# Patient Record
Sex: FEMALE | Race: WHITE | URBAN - METROPOLITAN AREA
[De-identification: names, ages, dates, MRNs, and addresses within clinical notes are randomized per-mention and may not be internally consistent; named-entity substitution may affect disease eponyms.]

---

## 2023-04-05 ENCOUNTER — NURSING HOME VISIT (OUTPATIENT)
Dept: POST ACUTE CARE | Facility: EXTERNAL LOCATION | Age: 88
End: 2023-04-05
Payer: MEDICARE

## 2023-04-05 DIAGNOSIS — J44.9 CHRONIC OBSTRUCTIVE PULMONARY DISEASE, UNSPECIFIED COPD TYPE (MULTI): ICD-10-CM

## 2023-04-05 DIAGNOSIS — I10 HYPERTENSION, UNSPECIFIED TYPE: ICD-10-CM

## 2023-04-05 DIAGNOSIS — K21.9 GASTROESOPHAGEAL REFLUX DISEASE WITHOUT ESOPHAGITIS: ICD-10-CM

## 2023-04-05 PROCEDURE — 99309 SBSQ NF CARE MODERATE MDM 30: CPT | Performed by: INTERNAL MEDICINE

## 2023-04-05 NOTE — LETTER
Patient: Mildred Nolen  : 1930    Encounter Date: 2023    PROGRESS NOTE    Subjective  Chief complaint: Mildred Nolen is a 89 y.o. female who is a long term care patient being seen and evaluated for monthly general medical care and follow-up.    HPI:   patient presents for general medical care and f/u.  Patient seen and examined at bedside.  No issues per nursing.  Patient has no acute complaints.    COPD stable, denies sob, wheezing, cough.  GERD controlled.  Denies heartburn, regurgitation, epigastric discomfort, sour taste, and cough.  HTN BP at goal.  Denies chest pain and headache.  No acute distress.       Objective  Vital signs: 124/74, 18, 98    Physical Exam  Constitutional:       General: She is not in acute distress.  Eyes:      Extraocular Movements: Extraocular movements intact.   Cardiovascular:      Rate and Rhythm: Regular rhythm.   Pulmonary:      Effort: Pulmonary effort is normal.      Breath sounds: Normal breath sounds.   Abdominal:      General: Bowel sounds are normal.      Palpations: Abdomen is soft.   Musculoskeletal:      Cervical back: Neck supple.      Right lower leg: Edema present.      Left lower leg: Edema present.   Neurological:      Mental Status: She is alert.   Psychiatric:         Mood and Affect: Mood normal.         Behavior: Behavior is cooperative.         Assessment/Plan  Problem List Items Addressed This Visit          Respiratory    COPD (chronic obstructive pulmonary disease) (CMS/[facility])     Fluticasone            Circulatory    HTN (hypertension)     Monitor BP  antihypertensives              Digestive    Gastroesophageal reflux disease without esophagitis     Controlled  PPI          Medications, treatments, and labs reviewed  Continue medications and treatments as listed in [facility]    Scribe Attestation  By signing my name below, ITahmina, Scribe   attest that this documentation has been prepared under the direction and in the presence of Nadir ROJAS  MD Wayne.    Provider Attestation - Scribe documentation  All medical record entries made by the Scribe were at my direction and personally dictated by me. I have reviewed the chart and agree that the record accurately reflects my personal performance of the history, physical exam, discussion and plan.      Electronically Signed By: Nadir Black MD   4/7/23 12:56 PM

## 2023-04-06 PROBLEM — K21.9 GASTROESOPHAGEAL REFLUX DISEASE WITHOUT ESOPHAGITIS: Status: ACTIVE | Noted: 2023-04-06

## 2023-04-06 PROBLEM — I10 HTN (HYPERTENSION): Status: ACTIVE | Noted: 2023-04-06

## 2023-04-06 PROBLEM — J44.9 COPD (CHRONIC OBSTRUCTIVE PULMONARY DISEASE) (MULTI): Status: ACTIVE | Noted: 2023-04-06

## 2023-04-07 NOTE — PROGRESS NOTES
PROGRESS NOTE    Subjective   Chief complaint: Mildred Nolen is a 89 y.o. female who is a long term care patient being seen and evaluated for monthly general medical care and follow-up.    HPI:   patient presents for general medical care and f/u.  Patient seen and examined at bedside.  No issues per nursing.  Patient has no acute complaints.    COPD stable, denies sob, wheezing, cough.  GERD controlled.  Denies heartburn, regurgitation, epigastric discomfort, sour taste, and cough.  HTN BP at goal.  Denies chest pain and headache.  No acute distress.       Objective   Vital signs: 124/74, 18, 98    Physical Exam  Constitutional:       General: She is not in acute distress.  Eyes:      Extraocular Movements: Extraocular movements intact.   Cardiovascular:      Rate and Rhythm: Regular rhythm.   Pulmonary:      Effort: Pulmonary effort is normal.      Breath sounds: Normal breath sounds.   Abdominal:      General: Bowel sounds are normal.      Palpations: Abdomen is soft.   Musculoskeletal:      Cervical back: Neck supple.      Right lower leg: Edema present.      Left lower leg: Edema present.   Neurological:      Mental Status: She is alert.   Psychiatric:         Mood and Affect: Mood normal.         Behavior: Behavior is cooperative.         Assessment/Plan   Problem List Items Addressed This Visit          Respiratory    COPD (chronic obstructive pulmonary disease) (CMS/[facility])     Fluticasone            Circulatory    HTN (hypertension)     Monitor BP  antihypertensives              Digestive    Gastroesophageal reflux disease without esophagitis     Controlled  PPI          Medications, treatments, and labs reviewed  Continue medications and treatments as listed in [facility]    Scribe Attestation  By signing my name below, Tahmina CHRISTIANSON, Scribe   attest that this documentation has been prepared under the direction and in the presence of Nadir Black MD.    Provider Attestation - Scribe documentation  All medical  record entries made by the Scribe were at my direction and personally dictated by me. I have reviewed the chart and agree that the record accurately reflects my personal performance of the history, physical exam, discussion and plan.

## 2023-04-26 ENCOUNTER — NURSING HOME VISIT (OUTPATIENT)
Dept: POST ACUTE CARE | Facility: EXTERNAL LOCATION | Age: 88
End: 2023-04-26
Payer: MEDICARE

## 2023-04-26 DIAGNOSIS — R05.1 ACUTE COUGH: ICD-10-CM

## 2023-04-26 DIAGNOSIS — J44.9 CHRONIC OBSTRUCTIVE PULMONARY DISEASE, UNSPECIFIED COPD TYPE (MULTI): ICD-10-CM

## 2023-04-26 PROCEDURE — 99308 SBSQ NF CARE LOW MDM 20: CPT | Performed by: INTERNAL MEDICINE

## 2023-04-26 NOTE — LETTER
Patient: Mildred Nolen  : 1930    Encounter Date: 2023    PROGRESS NOTE    Subjective  Chief complaint: Mildred Nolen is a 89 y.o. female who is a long term care patient being seen and evaluated for cough    HPI:  Patient presents for complaints of cough. Denies SOB, wheezing, congestion, fever or chills. No other concerns at this time.       Objective  Vital signs: 126/76, 98%    Physical Exam  Constitutional:       General: She is not in acute distress.  Eyes:      Extraocular Movements: Extraocular movements intact.   Cardiovascular:      Rate and Rhythm: Normal rate and regular rhythm.   Pulmonary:      Effort: Pulmonary effort is normal.      Breath sounds: Normal breath sounds.   Abdominal:      General: Bowel sounds are normal.      Palpations: Abdomen is soft.   Musculoskeletal:      Cervical back: Neck supple.      Right lower leg: No edema.      Left lower leg: No edema.   Neurological:      Mental Status: She is alert.   Psychiatric:         Mood and Affect: Mood normal.         Behavior: Behavior is cooperative.         Assessment/Plan  Problem List Items Addressed This Visit          Respiratory    COPD (chronic obstructive pulmonary disease) (CMS/HCC)     Fluticasone            Other    Acute cough     Mucinex  Monitor          Medications, treatments, and labs reviewed  Continue medications and treatments as listed in [facility]    Scribe Attestation  By signing my name below, ITahmina Scribe   attest that this documentation has been prepared under the direction and in the presence of Nadir Black MD.    Provider Attestation - Scribe documentation  All medical record entries made by the Scribe were at my direction and personally dictated by me. I have reviewed the chart and agree that the record accurately reflects my personal performance of the history, physical exam, discussion and plan.    1. Chronic obstructive pulmonary disease, unspecified COPD type (CMS/HCC)        2. Acute  cough               Electronically Signed By: Nadir Black MD   4/27/23  5:01 PM

## 2023-04-27 PROBLEM — R05.1 ACUTE COUGH: Status: ACTIVE | Noted: 2023-04-27

## 2023-04-27 NOTE — PROGRESS NOTES
PROGRESS NOTE    Subjective   Chief complaint: Mildred Nolen is a 89 y.o. female who is a long term care patient being seen and evaluated for cough    HPI:  Patient presents for complaints of cough. Denies SOB, wheezing, congestion, fever or chills. No other concerns at this time.       Objective   Vital signs: 126/76, 98%    Physical Exam  Constitutional:       General: She is not in acute distress.  Eyes:      Extraocular Movements: Extraocular movements intact.   Cardiovascular:      Rate and Rhythm: Normal rate and regular rhythm.   Pulmonary:      Effort: Pulmonary effort is normal.      Breath sounds: Normal breath sounds.   Abdominal:      General: Bowel sounds are normal.      Palpations: Abdomen is soft.   Musculoskeletal:      Cervical back: Neck supple.      Right lower leg: No edema.      Left lower leg: No edema.   Neurological:      Mental Status: She is alert.   Psychiatric:         Mood and Affect: Mood normal.         Behavior: Behavior is cooperative.         Assessment/Plan   Problem List Items Addressed This Visit          Respiratory    COPD (chronic obstructive pulmonary disease) (CMS/HCC)     Fluticasone            Other    Acute cough     Mucinex  Monitor          Medications, treatments, and labs reviewed  Continue medications and treatments as listed in [facility]    Scribe Attestation  By signing my name below, ITahmina Scribe   attest that this documentation has been prepared under the direction and in the presence of Nadir Black MD.    Provider Attestation - Scribe documentation  All medical record entries made by the Scribe were at my direction and personally dictated by me. I have reviewed the chart and agree that the record accurately reflects my personal performance of the history, physical exam, discussion and plan.    1. Chronic obstructive pulmonary disease, unspecified COPD type (CMS/HCC)        2. Acute cough

## 2023-05-10 ENCOUNTER — NURSING HOME VISIT (OUTPATIENT)
Dept: POST ACUTE CARE | Facility: EXTERNAL LOCATION | Age: 88
End: 2023-05-10
Payer: MEDICARE

## 2023-05-10 DIAGNOSIS — J44.9 CHRONIC OBSTRUCTIVE PULMONARY DISEASE, UNSPECIFIED COPD TYPE (MULTI): ICD-10-CM

## 2023-05-10 DIAGNOSIS — K21.9 GASTROESOPHAGEAL REFLUX DISEASE WITHOUT ESOPHAGITIS: ICD-10-CM

## 2023-05-10 DIAGNOSIS — I10 HYPERTENSION, UNSPECIFIED TYPE: ICD-10-CM

## 2023-05-10 PROCEDURE — 99309 SBSQ NF CARE MODERATE MDM 30: CPT | Performed by: INTERNAL MEDICINE

## 2023-05-10 NOTE — LETTER
Patient: Mildred Nolen  : 1930    Encounter Date: 05/10/2023    PROGRESS NOTE    Subjective  Chief complaint: Mildred Nolen is a 89 y.o. female who is a long term care patient being seen and evaluated for monthly general medical care and follow-up.    HPI:   patient presents for general medical care and f/u.  Patient seen and examined at bedside.  No issues per nursing.  Patient has no acute complaints.    COPD, denies sob, wheezing, cough.  GERD. Denies heartburn, regurgitation, epigastric discomfort, sour taste, and cough.  HTN,  Denies chest pain and headache.  No acute distress.       Objective  Vital signs: 126/73, 98%    Physical Exam  Constitutional:       General: She is not in acute distress.     Appearance: She is obese.   Eyes:      Extraocular Movements: Extraocular movements intact.   Cardiovascular:      Rate and Rhythm: Regular rhythm.   Pulmonary:      Effort: Pulmonary effort is normal.      Breath sounds: Normal breath sounds.   Abdominal:      General: Bowel sounds are normal.      Palpations: Abdomen is soft.   Musculoskeletal:      Cervical back: Neck supple.      Right lower leg: Edema present.      Left lower leg: Edema present.   Neurological:      Mental Status: She is alert.   Psychiatric:         Mood and Affect: Mood normal.         Behavior: Behavior is cooperative.         Assessment/Plan  Problem List Items Addressed This Visit          Respiratory    COPD (chronic obstructive pulmonary disease) (CMS/[facility])       Continue Fluticasone   monitor for shortness of breath            Circulatory    HTN (hypertension)     Monitor BP   Continue antihypertensives              Digestive    Gastroesophageal reflux disease without esophagitis     Controlled   Continue PPI        Medications, treatments, and labs reviewed  Continue medications and treatments as listed in [facility]    Scribe Attestation  By signing my name below, ITahmina, Scribe   attest that this documentation has been  prepared under the direction and in the presence of Nadir Black MD.    Provider Attestation - Scribe documentation  All medical record entries made by the Scribe were at my direction and personally dictated by me. I have reviewed the chart and agree that the record accurately reflects my personal performance of the history, physical exam, discussion and plan.      Electronically Signed By: Nadir Black MD   5/15/23 11:15 AM

## 2023-05-15 NOTE — PROGRESS NOTES
PROGRESS NOTE    Subjective   Chief complaint: Mildred Nolen is a 89 y.o. female who is a long term care patient being seen and evaluated for monthly general medical care and follow-up.    HPI:   patient presents for general medical care and f/u.  Patient seen and examined at bedside.  No issues per nursing.  Patient has no acute complaints.    COPD, denies sob, wheezing, cough.  GERD. Denies heartburn, regurgitation, epigastric discomfort, sour taste, and cough.  HTN,  Denies chest pain and headache.  No acute distress.       Objective   Vital signs: 126/73, 98%    Physical Exam  Constitutional:       General: She is not in acute distress.     Appearance: She is obese.   Eyes:      Extraocular Movements: Extraocular movements intact.   Cardiovascular:      Rate and Rhythm: Regular rhythm.   Pulmonary:      Effort: Pulmonary effort is normal.      Breath sounds: Normal breath sounds.   Abdominal:      General: Bowel sounds are normal.      Palpations: Abdomen is soft.   Musculoskeletal:      Cervical back: Neck supple.      Right lower leg: Edema present.      Left lower leg: Edema present.   Neurological:      Mental Status: She is alert.   Psychiatric:         Mood and Affect: Mood normal.         Behavior: Behavior is cooperative.         Assessment/Plan   Problem List Items Addressed This Visit          Respiratory    COPD (chronic obstructive pulmonary disease) (CMS/[facility])       Continue Fluticasone   monitor for shortness of breath            Circulatory    HTN (hypertension)     Monitor BP   Continue antihypertensives              Digestive    Gastroesophageal reflux disease without esophagitis     Controlled   Continue PPI        Medications, treatments, and labs reviewed  Continue medications and treatments as listed in [facility]    Scribe Attestation  By signing my name below, Tahmina CHRISTIANSON, Scribe   attest that this documentation has been prepared under the direction and in the presence of Nadir Black  MD.    Provider Attestation - Scribe documentation  All medical record entries made by the Scribe were at my direction and personally dictated by me. I have reviewed the chart and agree that the record accurately reflects my personal performance of the history, physical exam, discussion and plan.

## 2023-05-31 ENCOUNTER — NURSING HOME VISIT (OUTPATIENT)
Dept: POST ACUTE CARE | Facility: EXTERNAL LOCATION | Age: 88
End: 2023-05-31
Payer: MEDICARE

## 2023-05-31 DIAGNOSIS — I10 HYPERTENSION, UNSPECIFIED TYPE: ICD-10-CM

## 2023-05-31 DIAGNOSIS — J44.9 CHRONIC OBSTRUCTIVE PULMONARY DISEASE, UNSPECIFIED COPD TYPE (MULTI): ICD-10-CM

## 2023-05-31 PROCEDURE — 99308 SBSQ NF CARE LOW MDM 20: CPT | Performed by: INTERNAL MEDICINE

## 2023-05-31 NOTE — LETTER
Patient: Mildred Nolen  : 1930    Encounter Date: 2023    PROGRESS NOTE    Subjective  Chief complaint: Mildred Nolen is a 89 y.o. female who is a long term care patient being seen and evaluated for pain and cough    HPI:  I was asked to see patient and evaluate continued use of Mucinex. Patient has an order for PRN mucinex and cough has resolved. Denies SOB or Orthopnea. No acute distress. In addition patient with chronic pain I was asked to evaluate continued of Seroquel and percocet. Patient pain well controlled with pain meds ordered at this time. Denies SOB.        Objective  Vital signs: 125/74, 98%    Physical Exam  Constitutional:       General: She is not in acute distress.     Appearance: She is obese.   Eyes:      Extraocular Movements: Extraocular movements intact.   Cardiovascular:      Rate and Rhythm: Normal rate and regular rhythm.   Pulmonary:      Effort: Pulmonary effort is normal.      Breath sounds: Normal breath sounds.   Abdominal:      General: Bowel sounds are normal.      Palpations: Abdomen is soft.   Musculoskeletal:      Cervical back: Neck supple.      Right lower leg: Edema present.      Left lower leg: Edema present.   Neurological:      Mental Status: She is alert.   Psychiatric:         Mood and Affect: Mood normal.         Behavior: Behavior is cooperative.         Assessment/Plan  Problem List Items Addressed This Visit          Respiratory    COPD (chronic obstructive pulmonary disease) (CMS/[facility])       Continue Fluticasone   monitor for shortness of breath            Circulatory    HTN (hypertension)     Monitor BP   Continue antihypertensives            Medications, treatments, and labs reviewed  Continue medications and treatments as listed in [facility]    Scribe Attestation  By signing my name below, ITahmina Scribe   attest that this documentation has been prepared under the direction and in the presence of Nadir Black MD.    Provider Attestation - Scribe  documentation  All medical record entries made by the Scribe were at my direction and personally dictated by me. I have reviewed the chart and agree that the record accurately reflects my personal performance of the history, physical exam, discussion and plan.    1. Hypertension, unspecified type        2. Chronic obstructive pulmonary disease, unspecified COPD type (CMS/[facility])               Electronically Signed By: Nadir Black MD   6/4/23  2:01 PM

## 2023-06-03 NOTE — PROGRESS NOTES
PROGRESS NOTE    Subjective   Chief complaint: Mildred Nolen is a 89 y.o. female who is a long term care patient being seen and evaluated for pain and cough    HPI:  I was asked to see patient and evaluate continued use of Mucinex. Patient has an order for PRN mucinex and cough has resolved. Denies SOB or Orthopnea. No acute distress. In addition patient with chronic pain I was asked to evaluate continued of Seroquel and percocet. Patient pain well controlled with pain meds ordered at this time. Denies SOB.        Objective   Vital signs: 125/74, 98%    Physical Exam  Constitutional:       General: She is not in acute distress.     Appearance: She is obese.   Eyes:      Extraocular Movements: Extraocular movements intact.   Cardiovascular:      Rate and Rhythm: Normal rate and regular rhythm.   Pulmonary:      Effort: Pulmonary effort is normal.      Breath sounds: Normal breath sounds.   Abdominal:      General: Bowel sounds are normal.      Palpations: Abdomen is soft.   Musculoskeletal:      Cervical back: Neck supple.      Right lower leg: Edema present.      Left lower leg: Edema present.   Neurological:      Mental Status: She is alert.   Psychiatric:         Mood and Affect: Mood normal.         Behavior: Behavior is cooperative.         Assessment/Plan   Problem List Items Addressed This Visit          Respiratory    COPD (chronic obstructive pulmonary disease) (CMS/[facility])       Continue Fluticasone   monitor for shortness of breath            Circulatory    HTN (hypertension)     Monitor BP   Continue antihypertensives            Medications, treatments, and labs reviewed  Continue medications and treatments as listed in [facility]    Scribe Attestation  By signing my name below, Tahmina CHRISTIANSON Scribe   attest that this documentation has been prepared under the direction and in the presence of Nadir Black MD.    Provider Attestation - Scribe documentation  All medical record entries made by the Scribe were at  my direction and personally dictated by me. I have reviewed the chart and agree that the record accurately reflects my personal performance of the history, physical exam, discussion and plan.    1. Hypertension, unspecified type        2. Chronic obstructive pulmonary disease, unspecified COPD type (CMS/[facility])

## 2023-06-07 ENCOUNTER — NURSING HOME VISIT (OUTPATIENT)
Dept: POST ACUTE CARE | Facility: EXTERNAL LOCATION | Age: 88
End: 2023-06-07
Payer: MEDICARE

## 2023-06-07 DIAGNOSIS — J44.9 CHRONIC OBSTRUCTIVE PULMONARY DISEASE, UNSPECIFIED COPD TYPE (MULTI): ICD-10-CM

## 2023-06-07 DIAGNOSIS — L03.119 CELLULITIS OF LOWER EXTREMITY, UNSPECIFIED LATERALITY: ICD-10-CM

## 2023-06-07 DIAGNOSIS — K21.9 GASTROESOPHAGEAL REFLUX DISEASE WITHOUT ESOPHAGITIS: ICD-10-CM

## 2023-06-07 DIAGNOSIS — I10 HYPERTENSION, UNSPECIFIED TYPE: ICD-10-CM

## 2023-06-07 PROCEDURE — 99309 SBSQ NF CARE MODERATE MDM 30: CPT | Performed by: INTERNAL MEDICINE

## 2023-06-07 NOTE — LETTER
Patient: Mildred Nolen  : 1930    Encounter Date: 2023    PROGRESS NOTE    Subjective  Chief complaint: Mildred Nolen is a 89 y.o. female who is a long term care patient being seen and evaluated for monthly general medical care and follow-up.    HPI:   patient presents for general medical care and f/u.  Patient seen and examined at bedside.  No issues per nursing.  Patient has no acute complaints.    COPD stable, denies sob, wheezing, cough.  GERD controlled.  Denies heartburn, regurgitation, epigastric discomfort, sour taste, and cough.  HTN BP at goal.  Denies chest pain and headache. Patient continues on ATB for Cellulitis BLE, denies fever,chills, diarrhea or increased redness or pain.  No acute distress.       Objective  Vital signs: 124/74, 18, 98    Physical Exam  Constitutional:       General: She is not in acute distress.  Eyes:      Extraocular Movements: Extraocular movements intact.   Cardiovascular:      Rate and Rhythm: Regular rhythm.   Pulmonary:      Effort: Pulmonary effort is normal.      Breath sounds: Normal breath sounds.   Abdominal:      General: Bowel sounds are normal.      Palpations: Abdomen is soft.   Musculoskeletal:      Cervical back: Neck supple.      Right lower leg: Edema present.      Left lower leg: Edema present.   Neurological:      Mental Status: She is alert.   Psychiatric:         Mood and Affect: Mood normal.         Behavior: Behavior is cooperative.         Assessment/Plan  Problem List Items Addressed This Visit          Respiratory    COPD (chronic obstructive pulmonary disease) (CMS/[facility])       Continue Fluticasone   monitor for shortness of breath            Circulatory    HTN (hypertension)     Monitor BP   Continue antihypertensives              Digestive    Gastroesophageal reflux disease without esophagitis     Controlled   Continue PPI            Musculoskeletal    Cellulitis of lower extremity     Continue ATB until complete        Medications,  treatments, and labs reviewed  Continue medications and treatments as listed in [facility]    Scribe Attestation  By signing my name below, I, Darlyn Steeleibyomaira   attest that this documentation has been prepared under the direction and in the presence of Nadir Black MD.    Provider Attestation - Scribe documentation  All medical record entries made by the Scribe were at my direction and personally dictated by me. I have reviewed the chart and agree that the record accurately reflects my personal performance of the history, physical exam, discussion and plan.      Electronically Signed By: Nadir Black MD   6/10/23  2:28 PM

## 2023-06-09 PROBLEM — L03.119 CELLULITIS OF LOWER EXTREMITY: Status: ACTIVE | Noted: 2023-06-09

## 2023-06-09 NOTE — PROGRESS NOTES
PROGRESS NOTE    Subjective   Chief complaint: Mildred Nolen is a 89 y.o. female who is a long term care patient being seen and evaluated for monthly general medical care and follow-up.    HPI:   patient presents for general medical care and f/u.  Patient seen and examined at bedside.  No issues per nursing.  Patient has no acute complaints.    COPD stable, denies sob, wheezing, cough.  GERD controlled.  Denies heartburn, regurgitation, epigastric discomfort, sour taste, and cough.  HTN BP at goal.  Denies chest pain and headache. Patient continues on ATB for Cellulitis BLE, denies fever,chills, diarrhea or increased redness or pain.  No acute distress.       Objective   Vital signs: 124/74, 18, 98    Physical Exam  Constitutional:       General: She is not in acute distress.  Eyes:      Extraocular Movements: Extraocular movements intact.   Cardiovascular:      Rate and Rhythm: Regular rhythm.   Pulmonary:      Effort: Pulmonary effort is normal.      Breath sounds: Normal breath sounds.   Abdominal:      General: Bowel sounds are normal.      Palpations: Abdomen is soft.   Musculoskeletal:      Cervical back: Neck supple.      Right lower leg: Edema present.      Left lower leg: Edema present.   Neurological:      Mental Status: She is alert.   Psychiatric:         Mood and Affect: Mood normal.         Behavior: Behavior is cooperative.         Assessment/Plan   Problem List Items Addressed This Visit          Respiratory    COPD (chronic obstructive pulmonary disease) (CMS/[facility])       Continue Fluticasone   monitor for shortness of breath            Circulatory    HTN (hypertension)     Monitor BP   Continue antihypertensives              Digestive    Gastroesophageal reflux disease without esophagitis     Controlled   Continue PPI            Musculoskeletal    Cellulitis of lower extremity     Continue ATB until complete        Medications, treatments, and labs reviewed  Continue medications and treatments as  listed in [facility]    Scribe Attestation  By signing my name below, I, Joseph Steele   attest that this documentation has been prepared under the direction and in the presence of Nadir Black MD.    Provider Attestation - Scribe documentation  All medical record entries made by the Scribe were at my direction and personally dictated by me. I have reviewed the chart and agree that the record accurately reflects my personal performance of the history, physical exam, discussion and plan.

## 2023-06-14 ENCOUNTER — NURSING HOME VISIT (OUTPATIENT)
Dept: POST ACUTE CARE | Facility: EXTERNAL LOCATION | Age: 88
End: 2023-06-14
Payer: MEDICARE

## 2023-06-14 DIAGNOSIS — L03.116 CELLULITIS OF LEFT LOWER EXTREMITY: ICD-10-CM

## 2023-06-14 DIAGNOSIS — J44.9 CHRONIC OBSTRUCTIVE PULMONARY DISEASE, UNSPECIFIED COPD TYPE (MULTI): ICD-10-CM

## 2023-06-14 PROCEDURE — 99308 SBSQ NF CARE LOW MDM 20: CPT | Performed by: INTERNAL MEDICINE

## 2023-06-14 NOTE — LETTER
Patient: Mildred Nolen  : 1930    Encounter Date: 2023    PROGRESS NOTE    Subjective  Chief complaint: Mildred Nolen is a 89 y.o. female who is a long term care patient being seen and evaluated for cellulitis    HPI:  Patient has wound to her left heel which was recently debrided. She denies increased pain or discomfort to the area, however there is some redness and warmth to surrounding skin. No acute distress. Denies SOB.       Objective  Vital signs: 134/73, 97%    Physical Exam  Constitutional:       General: She is not in acute distress.  Eyes:      Extraocular Movements: Extraocular movements intact.   Cardiovascular:      Rate and Rhythm: Normal rate and regular rhythm.   Pulmonary:      Effort: Pulmonary effort is normal.      Breath sounds: Normal breath sounds.   Abdominal:      General: Bowel sounds are normal.      Palpations: Abdomen is soft.   Musculoskeletal:      Cervical back: Neck supple.      Right lower leg: Edema present.      Left lower leg: Edema present.   Skin:     Comments: Left heel wound - dressing intact  +redness to surrounding skin   Neurological:      Mental Status: She is alert.   Psychiatric:         Mood and Affect: Mood normal.         Behavior: Behavior is cooperative.         Assessment/Plan  Problem List Items Addressed This Visit          Respiratory    COPD (chronic obstructive pulmonary disease) (CMS/[facility])       Continue Fluticasone   monitor for shortness of breath            Musculoskeletal    Cellulitis of lower extremity     Bactrim DS x 7 days          Medications, treatments, and labs reviewed  Continue medications and treatments as listed in PCC    Scribe Attestation  By signing my name below, Tahmina CHRISTIANSON Scribe   attest that this documentation has been prepared under the direction and in the presence of Nadir Black MD.    Provider Attestation - Scribe documentation  All medical record entries made by the Scribe were at my direction and  personally dictated by me. I have reviewed the chart and agree that the record accurately reflects my personal performance of the history, physical exam, discussion and plan.    1. Cellulitis of left lower extremity        2. Chronic obstructive pulmonary disease, unspecified COPD type (CMS/[facility])               Electronically Signed By: Nadir Black MD   6/16/23  5:28 PM

## 2023-06-16 NOTE — PROGRESS NOTES
PROGRESS NOTE    Subjective   Chief complaint: Mildred Nolen is a 89 y.o. female who is a long term care patient being seen and evaluated for cellulitis    HPI:  Patient has wound to her left heel which was recently debrided. She denies increased pain or discomfort to the area, however there is some redness and warmth to surrounding skin. No acute distress. Denies SOB.       Objective   Vital signs: 134/73, 97%    Physical Exam  Constitutional:       General: She is not in acute distress.  Eyes:      Extraocular Movements: Extraocular movements intact.   Cardiovascular:      Rate and Rhythm: Normal rate and regular rhythm.   Pulmonary:      Effort: Pulmonary effort is normal.      Breath sounds: Normal breath sounds.   Abdominal:      General: Bowel sounds are normal.      Palpations: Abdomen is soft.   Musculoskeletal:      Cervical back: Neck supple.      Right lower leg: Edema present.      Left lower leg: Edema present.   Skin:     Comments: Left heel wound - dressing intact  +redness to surrounding skin   Neurological:      Mental Status: She is alert.   Psychiatric:         Mood and Affect: Mood normal.         Behavior: Behavior is cooperative.         Assessment/Plan   Problem List Items Addressed This Visit          Respiratory    COPD (chronic obstructive pulmonary disease) (CMS/[facility])       Continue Fluticasone   monitor for shortness of breath            Musculoskeletal    Cellulitis of lower extremity     Bactrim DS x 7 days          Medications, treatments, and labs reviewed  Continue medications and treatments as listed in PCC    Scribe Attestation  By signing my name below, I, Joseph Steele   attest that this documentation has been prepared under the direction and in the presence of Nadir Black MD.    Provider Attestation - Scribe documentation  All medical record entries made by the Scribe were at my direction and personally dictated by me. I have reviewed the chart and agree that the  record accurately reflects my personal performance of the history, physical exam, discussion and plan.    1. Cellulitis of left lower extremity        2. Chronic obstructive pulmonary disease, unspecified COPD type (CMS/[facility])

## 2023-07-07 ENCOUNTER — NURSING HOME VISIT (OUTPATIENT)
Dept: POST ACUTE CARE | Facility: EXTERNAL LOCATION | Age: 88
End: 2023-07-07
Payer: COMMERCIAL

## 2023-07-07 DIAGNOSIS — I10 HYPERTENSION, UNSPECIFIED TYPE: ICD-10-CM

## 2023-07-07 DIAGNOSIS — J44.9 CHRONIC OBSTRUCTIVE PULMONARY DISEASE, UNSPECIFIED COPD TYPE (MULTI): ICD-10-CM

## 2023-07-07 DIAGNOSIS — K21.9 GASTROESOPHAGEAL REFLUX DISEASE WITHOUT ESOPHAGITIS: ICD-10-CM

## 2023-07-07 PROCEDURE — 99309 SBSQ NF CARE MODERATE MDM 30: CPT | Performed by: INTERNAL MEDICINE

## 2023-07-07 NOTE — PROGRESS NOTES
PROGRESS NOTE    Subjective   Chief complaint: Mildred Nolen is a 89 y.o. female who is a long term care patient being seen and evaluated for monthly general medical care and follow-up.    HPI:   patient presents for general medical care and f/u.  Patient seen and examined at bedside.  No issues per nursing.  Patient has no acute complaints.    COPD stable, denies sob, wheezing, cough.  GERD controlled.  Denies heartburn, regurgitation, epigastric discomfort, sour taste, and cough.  HTN BP at goal.  Denies chest pain and headache.  No acute distress.       Objective   Vital signs: 124/74, 98%    Physical Exam  Constitutional:       General: She is not in acute distress.  Eyes:      Extraocular Movements: Extraocular movements intact.   Cardiovascular:      Rate and Rhythm: Regular rhythm.   Pulmonary:      Effort: Pulmonary effort is normal.      Breath sounds: Normal breath sounds.   Abdominal:      General: Bowel sounds are normal.      Palpations: Abdomen is soft.   Musculoskeletal:      Cervical back: Neck supple.      Right lower leg: Edema present.      Left lower leg: Edema present.   Neurological:      Mental Status: She is alert.   Psychiatric:         Mood and Affect: Mood normal.         Behavior: Behavior is cooperative.         Assessment/Plan   Problem List Items Addressed This Visit          Cardiac and Vasculature    HTN (hypertension)     Monitor BP   Continue antihypertensives              Gastrointestinal and Abdominal    Gastroesophageal reflux disease without esophagitis     Controlled   Continue PPI            Pulmonary and Pneumonias    COPD (chronic obstructive pulmonary disease) (CMS/[facility])       Continue Fluticasone   monitor for shortness of breath        Medications, treatments, and labs reviewed  Continue medications and treatments as listed in PCC    Scribe Attestation  By signing my name below, Tahmina CHRISTIANSON, Darlynibyomaira   attest that this documentation has been prepared under the direction  and in the presence of Nadir Black MD.    Provider Attestation - Scribe documentation  All medical record entries made by the Scribe were at my direction and personally dictated by me. I have reviewed the chart and agree that the record accurately reflects my personal performance of the history, physical exam, discussion and plan.

## 2023-07-07 NOTE — LETTER
Patient: Mildred Nolen  : 1930    Encounter Date: 2023    PROGRESS NOTE    Subjective  Chief complaint: Mildred Nolen is a 89 y.o. female who is a long term care patient being seen and evaluated for monthly general medical care and follow-up.    HPI:   patient presents for general medical care and f/u.  Patient seen and examined at bedside.  No issues per nursing.  Patient has no acute complaints.    COPD stable, denies sob, wheezing, cough.  GERD controlled.  Denies heartburn, regurgitation, epigastric discomfort, sour taste, and cough.  HTN BP at goal.  Denies chest pain and headache.  No acute distress.       Objective  Vital signs: 124/74, 98%    Physical Exam  Constitutional:       General: She is not in acute distress.  Eyes:      Extraocular Movements: Extraocular movements intact.   Cardiovascular:      Rate and Rhythm: Regular rhythm.   Pulmonary:      Effort: Pulmonary effort is normal.      Breath sounds: Normal breath sounds.   Abdominal:      General: Bowel sounds are normal.      Palpations: Abdomen is soft.   Musculoskeletal:      Cervical back: Neck supple.      Right lower leg: Edema present.      Left lower leg: Edema present.   Neurological:      Mental Status: She is alert.   Psychiatric:         Mood and Affect: Mood normal.         Behavior: Behavior is cooperative.         Assessment/Plan  Problem List Items Addressed This Visit          Cardiac and Vasculature    HTN (hypertension)     Monitor BP   Continue antihypertensives              Gastrointestinal and Abdominal    Gastroesophageal reflux disease without esophagitis     Controlled   Continue PPI            Pulmonary and Pneumonias    COPD (chronic obstructive pulmonary disease) (CMS/[facility])       Continue Fluticasone   monitor for shortness of breath        Medications, treatments, and labs reviewed  Continue medications and treatments as listed in PCC    Scribe Attestation  By signing my name below, I, Joseph Steele    attest that this documentation has been prepared under the direction and in the presence of Nadir Black MD.    Provider Attestation - Scribe documentation  All medical record entries made by the Scribe were at my direction and personally dictated by me. I have reviewed the chart and agree that the record accurately reflects my personal performance of the history, physical exam, discussion and plan.      Electronically Signed By: Nadir Black MD   7/8/23  2:17 PM

## 2023-08-02 ENCOUNTER — NURSING HOME VISIT (OUTPATIENT)
Dept: POST ACUTE CARE | Facility: EXTERNAL LOCATION | Age: 88
End: 2023-08-02
Payer: COMMERCIAL

## 2023-08-02 DIAGNOSIS — K21.9 GASTROESOPHAGEAL REFLUX DISEASE WITHOUT ESOPHAGITIS: ICD-10-CM

## 2023-08-02 DIAGNOSIS — J44.9 CHRONIC OBSTRUCTIVE PULMONARY DISEASE, UNSPECIFIED COPD TYPE (MULTI): Primary | ICD-10-CM

## 2023-08-02 DIAGNOSIS — I10 HYPERTENSION, UNSPECIFIED TYPE: ICD-10-CM

## 2023-08-02 PROCEDURE — 99309 SBSQ NF CARE MODERATE MDM 30: CPT | Performed by: INTERNAL MEDICINE

## 2023-08-02 NOTE — LETTER
Patient: Mildred Nolen  : 1930    Encounter Date: 2023    PROGRESS NOTE    Subjective  Chief complaint: Mildred Nolen is a 89 y.o. female who is a long term care patient being seen and evaluated for monthly general medical care and follow-up.    HPI:   patient presents for general medical care and f/u.  Patient seen and examined at bedside.  No issues per nursing.  Patient has no acute complaints.    COPD stable, denies sob, wheezing, cough.  GERD controlled.  Denies heartburn, regurgitation, epigastric discomfort, sour taste, and cough.  HTN BP at goal.  Denies chest pain and headache.  No acute distress.       Objective  Vital signs: 124/74, 98%    Physical Exam  Constitutional:       General: She is not in acute distress.  Eyes:      Extraocular Movements: Extraocular movements intact.   Cardiovascular:      Rate and Rhythm: Regular rhythm.   Pulmonary:      Effort: Pulmonary effort is normal.      Breath sounds: Normal breath sounds.   Abdominal:      General: Bowel sounds are normal.      Palpations: Abdomen is soft.   Musculoskeletal:      Cervical back: Neck supple.      Right lower leg: Edema present.      Left lower leg: Edema present.   Neurological:      Mental Status: She is alert.   Psychiatric:         Mood and Affect: Mood normal.         Behavior: Behavior is cooperative.         Assessment/Plan  Problem List Items Addressed This Visit       HTN (hypertension)     Monitor BP   Continue antihypertensives           Gastroesophageal reflux disease without esophagitis     Controlled   Continue PPI         COPD (chronic obstructive pulmonary disease) (CMS/[facility]) - Primary       Continue Fluticasone   monitor for shortness of breath        Medications, treatments, and labs reviewed  Continue medications and treatments as listed in PCC    Scribe Attestation  By signing my name below, ITahmina, Scribe   attest that this documentation has been prepared under the direction and in the presence  of Nadir Black MD.    Provider Attestation - Scribe documentation  All medical record entries made by the Scribe were at my direction and personally dictated by me. I have reviewed the chart and agree that the record accurately reflects my personal performance of the history, physical exam, discussion and plan.      Electronically Signed By: Nadir Black MD   8/3/23  3:12 PM

## 2023-08-03 NOTE — PROGRESS NOTES
PROGRESS NOTE    Subjective   Chief complaint: Mildred Nolen is a 89 y.o. female who is a long term care patient being seen and evaluated for monthly general medical care and follow-up.    HPI:   patient presents for general medical care and f/u.  Patient seen and examined at bedside.  No issues per nursing.  Patient has no acute complaints.    COPD stable, denies sob, wheezing, cough.  GERD controlled.  Denies heartburn, regurgitation, epigastric discomfort, sour taste, and cough.  HTN BP at goal.  Denies chest pain and headache.  No acute distress.       Objective   Vital signs: 124/74, 98%    Physical Exam  Constitutional:       General: She is not in acute distress.  Eyes:      Extraocular Movements: Extraocular movements intact.   Cardiovascular:      Rate and Rhythm: Regular rhythm.   Pulmonary:      Effort: Pulmonary effort is normal.      Breath sounds: Normal breath sounds.   Abdominal:      General: Bowel sounds are normal.      Palpations: Abdomen is soft.   Musculoskeletal:      Cervical back: Neck supple.      Right lower leg: Edema present.      Left lower leg: Edema present.   Neurological:      Mental Status: She is alert.   Psychiatric:         Mood and Affect: Mood normal.         Behavior: Behavior is cooperative.         Assessment/Plan   Problem List Items Addressed This Visit       HTN (hypertension)     Monitor BP   Continue antihypertensives           Gastroesophageal reflux disease without esophagitis     Controlled   Continue PPI         COPD (chronic obstructive pulmonary disease) (CMS/[facility]) - Primary       Continue Fluticasone   monitor for shortness of breath        Medications, treatments, and labs reviewed  Continue medications and treatments as listed in PCC    Scribe Attestation  By signing my name below, ITahmina, Joseph   attest that this documentation has been prepared under the direction and in the presence of Nadir Black MD.    Provider Attestation - Darlynibyomaira  documentation  All medical record entries made by the Scribe were at my direction and personally dictated by me. I have reviewed the chart and agree that the record accurately reflects my personal performance of the history, physical exam, discussion and plan.

## 2023-08-18 ENCOUNTER — NURSING HOME VISIT (OUTPATIENT)
Dept: POST ACUTE CARE | Facility: EXTERNAL LOCATION | Age: 88
End: 2023-08-18
Payer: COMMERCIAL

## 2023-08-18 DIAGNOSIS — R60.9 EDEMA, UNSPECIFIED TYPE: Primary | ICD-10-CM

## 2023-08-18 DIAGNOSIS — Z51.81 ENCOUNTER FOR MONITORING ANTIPLATELET THERAPY: ICD-10-CM

## 2023-08-18 DIAGNOSIS — Z79.02 ENCOUNTER FOR MONITORING ANTIPLATELET THERAPY: ICD-10-CM

## 2023-08-18 PROCEDURE — 99308 SBSQ NF CARE LOW MDM 20: CPT | Performed by: INTERNAL MEDICINE

## 2023-08-18 NOTE — LETTER
Patient: Mildred Nolen  : 1930    Encounter Date: 2023    PROGRESS NOTE    Subjective  Chief complaint: Mildred Nolen is a 89 y.o. female who is a long term care patient being seen and evaluated for edema, AC    HPI:  I was asked to see patient and evaluate for labs that correspond with medications that she is currently taking. Patient with edema to BLE taking Lasix and no labs ordered for follow up. In addition patient also on anti platelet therapy and takes ASA daily with no corresponding labs for follow up. No other concerns at this time. No acute distress.        Objective  Vital signs: 132/76, 98%    Physical Exam  Constitutional:       General: She is not in acute distress.  Eyes:      Extraocular Movements: Extraocular movements intact.   Cardiovascular:      Rate and Rhythm: Normal rate and regular rhythm.   Pulmonary:      Effort: Pulmonary effort is normal.      Breath sounds: Normal breath sounds.   Abdominal:      General: Bowel sounds are normal.      Palpations: Abdomen is soft.   Musculoskeletal:      Cervical back: Neck supple.      Right lower leg: Edema present.      Left lower leg: Edema present.   Neurological:      Mental Status: She is alert.   Psychiatric:         Mood and Affect: Mood normal.         Behavior: Behavior is cooperative.         Assessment/Plan  Problem List Items Addressed This Visit       Edema - Primary     Ace wraps BLE  Elevate legs  Continue lasix  Obtain BMP Q 3 months           Encounter for monitoring antiplatelet therapy     Continue ASA daily  Obtain CBC Q 6 months          Medications, treatments, and labs reviewed  Continue medications and treatments as listed in PCC    Scribe Attestation  By signing my name below, Tahmina CHRISTIANSON Scribe   attest that this documentation has been prepared under the direction and in the presence of Nadir Black MD.    Provider Attestation - Scribe documentation  All medical record entries made by the Scribe were at my  direction and personally dictated by me. I have reviewed the chart and agree that the record accurately reflects my personal performance of the history, physical exam, discussion and plan.    1. Edema, unspecified type        2. Encounter for monitoring antiplatelet therapy               Electronically Signed By: Nadir Black MD   8/18/23  2:45 PM

## 2023-08-18 NOTE — PROGRESS NOTES
PROGRESS NOTE    Subjective   Chief complaint: Mildred Nolen is a 89 y.o. female who is a long term care patient being seen and evaluated for edema, AC    HPI:  I was asked to see patient and evaluate for labs that correspond with medications that she is currently taking. Patient with edema to BLE taking Lasix and no labs ordered for follow up. In addition patient also on anti platelet therapy and takes ASA daily with no corresponding labs for follow up. No other concerns at this time. No acute distress.        Objective   Vital signs: 132/76, 98%    Physical Exam  Constitutional:       General: She is not in acute distress.  Eyes:      Extraocular Movements: Extraocular movements intact.   Cardiovascular:      Rate and Rhythm: Normal rate and regular rhythm.   Pulmonary:      Effort: Pulmonary effort is normal.      Breath sounds: Normal breath sounds.   Abdominal:      General: Bowel sounds are normal.      Palpations: Abdomen is soft.   Musculoskeletal:      Cervical back: Neck supple.      Right lower leg: Edema present.      Left lower leg: Edema present.   Neurological:      Mental Status: She is alert.   Psychiatric:         Mood and Affect: Mood normal.         Behavior: Behavior is cooperative.         Assessment/Plan   Problem List Items Addressed This Visit       Edema - Primary     Ace wraps BLE  Elevate legs  Continue lasix  Obtain BMP Q 3 months           Encounter for monitoring antiplatelet therapy     Continue ASA daily  Obtain CBC Q 6 months          Medications, treatments, and labs reviewed  Continue medications and treatments as listed in PCC    Scribe Attestation  By signing my name below, ITahmina Scribe   attest that this documentation has been prepared under the direction and in the presence of Nadir Black MD.    Provider Attestation - Scribe documentation  All medical record entries made by the Scribe were at my direction and personally dictated by me. I have reviewed the chart  and agree that the record accurately reflects my personal performance of the history, physical exam, discussion and plan.    1. Edema, unspecified type        2. Encounter for monitoring antiplatelet therapy

## 2023-09-15 ENCOUNTER — NURSING HOME VISIT (OUTPATIENT)
Dept: POST ACUTE CARE | Facility: EXTERNAL LOCATION | Age: 88
End: 2023-09-15
Payer: COMMERCIAL

## 2023-09-15 DIAGNOSIS — I10 HYPERTENSION, UNSPECIFIED TYPE: ICD-10-CM

## 2023-09-15 DIAGNOSIS — J44.9 CHRONIC OBSTRUCTIVE PULMONARY DISEASE, UNSPECIFIED COPD TYPE (MULTI): ICD-10-CM

## 2023-09-15 DIAGNOSIS — R05.1 ACUTE COUGH: ICD-10-CM

## 2023-09-15 DIAGNOSIS — K21.9 GASTROESOPHAGEAL REFLUX DISEASE WITHOUT ESOPHAGITIS: Primary | ICD-10-CM

## 2023-09-15 DIAGNOSIS — J06.9 UPPER RESPIRATORY TRACT INFECTION, UNSPECIFIED TYPE: ICD-10-CM

## 2023-09-15 PROCEDURE — 99309 SBSQ NF CARE MODERATE MDM 30: CPT | Performed by: INTERNAL MEDICINE

## 2023-09-15 NOTE — LETTER
Patient: Mildred Nolen  : 1930    Encounter Date: 09/15/2023    PROGRESS NOTE    Subjective  Chief complaint: Mildred Nolen is a 89 y.o. female who is a long term care patient being seen and evaluated for monthly general medical care and follow-up. cough    HPI:   patient presents for general medical care and f/u.  Patient seen and examined at bedside.  No issues per nursing.  Patient has no acute complaints.    COPD stable, denies sob, wheezing, cough.  GERD controlled.  Denies heartburn, regurgitation, epigastric discomfort, sour taste, and cough.  HTN BP at goal.  Denies chest pain and headache. Patient recently completed a course of ATB for URI. However she continues to have moist cough and post nasal drip. She denies SOB, wheezing fever or chills but does endorse large amount of phlegm which is clear in color.  No acute distress.       Objective  Vital signs: 129/76, 98%    Physical Exam  Constitutional:       General: She is not in acute distress.  Eyes:      Extraocular Movements: Extraocular movements intact.   Cardiovascular:      Rate and Rhythm: Regular rhythm.   Pulmonary:      Effort: Pulmonary effort is normal.      Breath sounds: Normal breath sounds.   Abdominal:      General: Bowel sounds are normal.      Palpations: Abdomen is soft.   Musculoskeletal:      Cervical back: Neck supple.      Right lower leg: Edema present.      Left lower leg: Edema present.   Neurological:      Mental Status: She is alert.   Psychiatric:         Mood and Affect: Mood normal.         Behavior: Behavior is cooperative.         Assessment/Plan  Problem List Items Addressed This Visit       HTN (hypertension)     Monitor BP   Continue antihypertensives           Gastroesophageal reflux disease without esophagitis - Primary     Controlled   Continue PPI         COPD (chronic obstructive pulmonary disease) (CMS/[facility])       Continue Fluticasone   monitor for shortness of breath         Acute cough     Start Mucinex and  Medrol dose pack  Continue Flonase         URI (upper respiratory infection)     ATB complete  Continued moist cough with post nasal drip        Medications, treatments, and labs reviewed  Continue medications and treatments as listed in PCC    Scribe Attestation  By signing my name below, I, Joseph Steele   attest that this documentation has been prepared under the direction and in the presence of Nadir Black MD.    Provider Attestation - Scribe documentation  All medical record entries made by the Scribe were at my direction and personally dictated by me. I have reviewed the chart and agree that the record accurately reflects my personal performance of the history, physical exam, discussion and plan.  1. Gastroesophageal reflux disease without esophagitis        2. Chronic obstructive pulmonary disease, unspecified COPD type (CMS/[facility])        3. Upper respiratory tract infection, unspecified type        4. Acute cough        5. Hypertension, unspecified type              Electronically Signed By: Nadir Black MD   9/16/23  2:10 PM

## 2023-09-15 NOTE — PROGRESS NOTES
PROGRESS NOTE    Subjective   Chief complaint: Mildred Nolen is a 89 y.o. female who is a long term care patient being seen and evaluated for monthly general medical care and follow-up. cough    HPI:   patient presents for general medical care and f/u.  Patient seen and examined at bedside.  No issues per nursing.  Patient has no acute complaints.    COPD stable, denies sob, wheezing, cough.  GERD controlled.  Denies heartburn, regurgitation, epigastric discomfort, sour taste, and cough.  HTN BP at goal.  Denies chest pain and headache. Patient recently completed a course of ATB for URI. However she continues to have moist cough and post nasal drip. She denies SOB, wheezing fever or chills but does endorse large amount of phlegm which is clear in color.  No acute distress.       Objective   Vital signs: 129/76, 98%    Physical Exam  Constitutional:       General: She is not in acute distress.  Eyes:      Extraocular Movements: Extraocular movements intact.   Cardiovascular:      Rate and Rhythm: Regular rhythm.   Pulmonary:      Effort: Pulmonary effort is normal.      Breath sounds: Normal breath sounds.   Abdominal:      General: Bowel sounds are normal.      Palpations: Abdomen is soft.   Musculoskeletal:      Cervical back: Neck supple.      Right lower leg: Edema present.      Left lower leg: Edema present.   Neurological:      Mental Status: She is alert.   Psychiatric:         Mood and Affect: Mood normal.         Behavior: Behavior is cooperative.         Assessment/Plan   Problem List Items Addressed This Visit       HTN (hypertension)     Monitor BP   Continue antihypertensives           Gastroesophageal reflux disease without esophagitis - Primary     Controlled   Continue PPI         COPD (chronic obstructive pulmonary disease) (CMS/[facility])       Continue Fluticasone   monitor for shortness of breath         Acute cough     Start Mucinex and Medrol dose pack  Continue Flonase         URI (upper respiratory  infection)     ATB complete  Continued moist cough with post nasal drip        Medications, treatments, and labs reviewed  Continue medications and treatments as listed in P[facility]    Scribe Attestation  By signing my name below, I, Joseph Steele   attest that this documentation has been prepared under the direction and in the presence of Nadir Black MD.    Provider Attestation - Scribe documentation  All medical record entries made by the Scribe were at my direction and personally dictated by me. I have reviewed the chart and agree that the record accurately reflects my personal performance of the history, physical exam, discussion and plan.  1. Gastroesophageal reflux disease without esophagitis        2. Chronic obstructive pulmonary disease, unspecified COPD type (CMS/[facility])        3. Upper respiratory tract infection, unspecified type        4. Acute cough        5. Hypertension, unspecified type

## 2023-10-04 ENCOUNTER — NURSING HOME VISIT (OUTPATIENT)
Dept: POST ACUTE CARE | Facility: EXTERNAL LOCATION | Age: 88
End: 2023-10-04
Payer: MEDICARE

## 2023-10-04 DIAGNOSIS — I10 HYPERTENSION, UNSPECIFIED TYPE: Primary | ICD-10-CM

## 2023-10-04 DIAGNOSIS — J44.9 CHRONIC OBSTRUCTIVE PULMONARY DISEASE, UNSPECIFIED COPD TYPE (MULTI): ICD-10-CM

## 2023-10-04 DIAGNOSIS — K21.9 GASTROESOPHAGEAL REFLUX DISEASE WITHOUT ESOPHAGITIS: ICD-10-CM

## 2023-10-04 PROCEDURE — 99309 SBSQ NF CARE MODERATE MDM 30: CPT | Performed by: INTERNAL MEDICINE

## 2023-10-04 NOTE — PROGRESS NOTES
PROGRESS NOTE    Subjective   Chief complaint: Mildred Nolen is a 89 y.o. female who is a long term care patient being seen and evaluated for monthly general medical care and follow-up. cough    HPI:   patient presents for general medical care and f/u.  Patient seen and examined at bedside.  No issues per nursing.  Patient has no acute complaints.    COPD stable, denies sob, wheezing, cough.  GERD controlled.  Denies heartburn, regurgitation, epigastric discomfort, sour taste, and cough.  HTN BP at goal.  Denies chest pain and headache.  No acute distress.       Objective   Vital signs: 127/76, 98%    Physical Exam  Constitutional:       General: She is not in acute distress.  Eyes:      Extraocular Movements: Extraocular movements intact.   Cardiovascular:      Rate and Rhythm: Regular rhythm.   Pulmonary:      Effort: Pulmonary effort is normal.      Breath sounds: Normal breath sounds.   Abdominal:      General: Bowel sounds are normal.      Palpations: Abdomen is soft.   Musculoskeletal:      Cervical back: Neck supple.      Right lower leg: Edema present.      Left lower leg: Edema present.   Neurological:      Mental Status: She is alert.   Psychiatric:         Mood and Affect: Mood normal.         Behavior: Behavior is cooperative.         Assessment/Plan   Problem List Items Addressed This Visit       HTN (hypertension) - Primary     Monitor BP   Continue antihypertensives           Gastroesophageal reflux disease without esophagitis     Controlled   Continue PPI         COPD (chronic obstructive pulmonary disease) (CMS/[facility])       Continue Fluticasone   monitor for shortness of breath        Medications, treatments, and labs reviewed  Continue medications and treatments as listed in [facility]    Scribe Attestation  By signing my name below, Tahmina CHRISTIANSON, Scribyomaira   attest that this documentation has been prepared under the direction and in the presence of Nadir Black MD.    Provider Attestation - Scribe  documentation  All medical record entries made by the Scribe were at my direction and personally dictated by me. I have reviewed the chart and agree that the record accurately reflects my personal performance of the history, physical exam, discussion and plan.  1. Hypertension, unspecified type        2. Chronic obstructive pulmonary disease, unspecified COPD type (CMS/[facility])        3. Gastroesophageal reflux disease without esophagitis

## 2023-10-04 NOTE — LETTER
Patient: Mildred Nolen  : 1930    Encounter Date: 10/04/2023    PROGRESS NOTE    Subjective  Chief complaint: Mildred Nolen is a 89 y.o. female who is a long term care patient being seen and evaluated for monthly general medical care and follow-up. cough    HPI:   patient presents for general medical care and f/u.  Patient seen and examined at bedside.  No issues per nursing.  Patient has no acute complaints.    COPD stable, denies sob, wheezing, cough.  GERD controlled.  Denies heartburn, regurgitation, epigastric discomfort, sour taste, and cough.  HTN BP at goal.  Denies chest pain and headache.  No acute distress.       Objective  Vital signs: 127/76, 98%    Physical Exam  Constitutional:       General: She is not in acute distress.  Eyes:      Extraocular Movements: Extraocular movements intact.   Cardiovascular:      Rate and Rhythm: Regular rhythm.   Pulmonary:      Effort: Pulmonary effort is normal.      Breath sounds: Normal breath sounds.   Abdominal:      General: Bowel sounds are normal.      Palpations: Abdomen is soft.   Musculoskeletal:      Cervical back: Neck supple.      Right lower leg: Edema present.      Left lower leg: Edema present.   Neurological:      Mental Status: She is alert.   Psychiatric:         Mood and Affect: Mood normal.         Behavior: Behavior is cooperative.         Assessment/Plan  Problem List Items Addressed This Visit       HTN (hypertension) - Primary     Monitor BP   Continue antihypertensives           Gastroesophageal reflux disease without esophagitis     Controlled   Continue PPI         COPD (chronic obstructive pulmonary disease) (CMS/[facility])       Continue Fluticasone   monitor for shortness of breath        Medications, treatments, and labs reviewed  Continue medications and treatments as listed in P[facility]    Scribe Attestation  By signing my name below, ITahmina, Scribe   attest that this documentation has been prepared under the direction and in the  presence of Nadir Black MD.    Provider Attestation - Scribe documentation  All medical record entries made by the Scribe were at my direction and personally dictated by me. I have reviewed the chart and agree that the record accurately reflects my personal performance of the history, physical exam, discussion and plan.  1. Hypertension, unspecified type        2. Chronic obstructive pulmonary disease, unspecified COPD type (CMS/[facility])        3. Gastroesophageal reflux disease without esophagitis              Electronically Signed By: Nadir Black MD   10/4/23  5:01 PM

## 2023-10-10 ENCOUNTER — NURSING HOME VISIT (OUTPATIENT)
Dept: POST ACUTE CARE | Facility: EXTERNAL LOCATION | Age: 88
End: 2023-10-10
Payer: MEDICARE

## 2023-10-10 DIAGNOSIS — L03.115 CELLULITIS OF RIGHT LOWER EXTREMITY: ICD-10-CM

## 2023-10-10 DIAGNOSIS — R60.9 EDEMA, UNSPECIFIED TYPE: Primary | ICD-10-CM

## 2023-10-10 DIAGNOSIS — J44.9 CHRONIC OBSTRUCTIVE PULMONARY DISEASE, UNSPECIFIED COPD TYPE (MULTI): ICD-10-CM

## 2023-10-10 PROCEDURE — 99309 SBSQ NF CARE MODERATE MDM 30: CPT | Performed by: NURSE PRACTITIONER

## 2023-10-10 NOTE — LETTER
Patient: Mildred Nolen  : 1930    Encounter Date: 10/10/2023    PROGRESS NOTE    Subjective  Chief complaint: Mildred Nolen is a 89 y.o. female who is a long term care patient being seen and evaluated for reported infection on her leg     HPI:  Pleasant and talkative. Reports that her leg is infected. She reports a long history of cellulitis. Is currently sitting up in lounge chair with her legs elevated. Wearing ace wraps.  HPI      Objective  Vital signs: 136.73-88-18-98.2    Physical Exam  Constitutional:       General: She is not in acute distress.  HENT:      Head: Normocephalic.   Eyes:      Extraocular Movements: Extraocular movements intact.   Cardiovascular:      Rate and Rhythm: Normal rate and regular rhythm.   Pulmonary:      Effort: Pulmonary effort is normal.      Breath sounds: Normal breath sounds.   Musculoskeletal:         General: Swelling present.      Right lower leg: Edema present.      Left lower leg: Edema present.        Legs:       Comments: Has pronounced dependent edema in both legs   Ventral aspect of right leg presents -erythema with warmth. Reports tenderness to slight touch.   Skin is intact. No drainage.            Neurological:      Mental Status: She is alert.   Psychiatric:         Behavior: Behavior is cooperative.         Assessment/Plan  Problem List Items Addressed This Visit       COPD (chronic obstructive pulmonary disease) (CMS/[facility])     Lungs clear.   No wheezing   Denies any SOB          Cellulitis of lower extremity     Redness with warmth evident in right lower leg  Reports that she has had multiple similar occurences in the past  Reports tenderness to touch.   Doxycycline  Encourage elevation  Monitor              Edema - Primary     Pronounced dependent edema in both legs  Encourage elevation  Ace wraps for light compression.             Medications, treatments, and labs reviewed  Continue medications and treatments as listed in EMR    Lauren Cerna,  KATHERINE-CNP      Electronically Signed By: ELIZABETH Howard   10/10/23  7:08 PM

## 2023-10-10 NOTE — ASSESSMENT & PLAN NOTE
Redness with warmth evident in right lower leg  Reports that she has had multiple similar occurences in the past  Reports tenderness to touch.   Doxycycline  Encourage elevation  Monitor

## 2023-10-10 NOTE — PROGRESS NOTES
PROGRESS NOTE    Subjective   Chief complaint: Mildred Nolen is a 89 y.o. female who is a long term care patient being seen and evaluated for reported infection on her leg     HPI:  Pleasant and talkative. Reports that her leg is infected. She reports a long history of cellulitis. Is currently sitting up in lounge chair with her legs elevated. Wearing ace wraps.  HPI      Objective   Vital signs: 136.73-88-18-98.2    Physical Exam  Constitutional:       General: She is not in acute distress.  HENT:      Head: Normocephalic.   Eyes:      Extraocular Movements: Extraocular movements intact.   Cardiovascular:      Rate and Rhythm: Normal rate and regular rhythm.   Pulmonary:      Effort: Pulmonary effort is normal.      Breath sounds: Normal breath sounds.   Musculoskeletal:         General: Swelling present.      Right lower leg: Edema present.      Left lower leg: Edema present.        Legs:       Comments: Has pronounced dependent edema in both legs   Ventral aspect of right leg presents -erythema with warmth. Reports tenderness to slight touch.   Skin is intact. No drainage.            Neurological:      Mental Status: She is alert.   Psychiatric:         Behavior: Behavior is cooperative.         Assessment/Plan   Problem List Items Addressed This Visit       COPD (chronic obstructive pulmonary disease) (CMS/HCC)     Lungs clear.   No wheezing   Denies any SOB          Cellulitis of lower extremity     Redness with warmth evident in right lower leg  Reports that she has had multiple similar occurences in the past  Reports tenderness to touch.   Doxycycline  Encourage elevation  Monitor              Edema - Primary     Pronounced dependent edema in both legs  Encourage elevation  Ace wraps for light compression.             Medications, treatments, and labs reviewed  Continue medications and treatments as listed in EMR    Lauren Cerna, APRN-CNP

## 2023-10-13 ENCOUNTER — NURSING HOME VISIT (OUTPATIENT)
Dept: POST ACUTE CARE | Facility: EXTERNAL LOCATION | Age: 88
End: 2023-10-13
Payer: MEDICARE

## 2023-10-13 DIAGNOSIS — I10 HYPERTENSION, UNSPECIFIED TYPE: Primary | ICD-10-CM

## 2023-10-13 DIAGNOSIS — L03.119 CELLULITIS OF LOWER EXTREMITY, UNSPECIFIED LATERALITY: ICD-10-CM

## 2023-10-13 PROCEDURE — 99308 SBSQ NF CARE LOW MDM 20: CPT | Performed by: INTERNAL MEDICINE

## 2023-10-13 NOTE — PROGRESS NOTES
PROGRESS NOTE    Subjective   Chief complaint: Mildred Nolen is a 89 y.o. female who is a long term care patient being seen and evaluated for cellulitis    HPI:  Patient continues on ATB for cellulitis denies adverse effects. Redness and edema is improving. Denies chest pain or headache. No acute distress.       Objective   Vital signs: 129/67, 98%    Physical Exam  Constitutional:       General: She is not in acute distress.  Eyes:      Extraocular Movements: Extraocular movements intact.   Cardiovascular:      Rate and Rhythm: Normal rate and regular rhythm.   Pulmonary:      Effort: Pulmonary effort is normal.      Breath sounds: Normal breath sounds.   Abdominal:      General: Bowel sounds are normal.      Palpations: Abdomen is soft.   Musculoskeletal:      Cervical back: Neck supple.      Right lower leg: Edema present.      Left lower leg: Edema present.   Neurological:      Mental Status: She is alert.   Psychiatric:         Mood and Affect: Mood normal.         Behavior: Behavior is cooperative.         Assessment/Plan   Problem List Items Addressed This Visit       HTN (hypertension) - Primary     Monitor BP   Continue antihypertensives           Cellulitis of lower extremity     ATB until complete          Medications, treatments, and labs reviewed  Continue medications and treatments as listed in [facility]    Scribe Attestation  By signing my name below, ITahmina Scribe   attest that this documentation has been prepared under the direction and in the presence of Nadir Black MD.    Provider Attestation - Scribe documentation  All medical record entries made by the Scribe were at my direction and personally dictated by me. I have reviewed the chart and agree that the record accurately reflects my personal performance of the history, physical exam, discussion and plan.    1. Hypertension, unspecified type        2. Cellulitis of lower extremity, unspecified laterality

## 2023-10-13 NOTE — LETTER
Patient: Mildred Nolen  : 1930    Encounter Date: 10/13/2023    PROGRESS NOTE    Subjective  Chief complaint: Mildred Nolen is a 89 y.o. female who is a long term care patient being seen and evaluated for cellulitis    HPI:  Patient continues on ATB for cellulitis denies adverse effects. Redness and edema is improving. Denies chest pain or headache. No acute distress.       Objective  Vital signs: 129/67, 98%    Physical Exam  Constitutional:       General: She is not in acute distress.  Eyes:      Extraocular Movements: Extraocular movements intact.   Cardiovascular:      Rate and Rhythm: Normal rate and regular rhythm.   Pulmonary:      Effort: Pulmonary effort is normal.      Breath sounds: Normal breath sounds.   Abdominal:      General: Bowel sounds are normal.      Palpations: Abdomen is soft.   Musculoskeletal:      Cervical back: Neck supple.      Right lower leg: Edema present.      Left lower leg: Edema present.   Neurological:      Mental Status: She is alert.   Psychiatric:         Mood and Affect: Mood normal.         Behavior: Behavior is cooperative.         Assessment/Plan  Problem List Items Addressed This Visit       HTN (hypertension) - Primary     Monitor BP   Continue antihypertensives           Cellulitis of lower extremity     ATB until complete          Medications, treatments, and labs reviewed  Continue medications and treatments as listed in [facility]    Scribe Attestation  By signing my name below, I, Joseph Steele   attest that this documentation has been prepared under the direction and in the presence of Nadir Black MD.    Provider Attestation - Scribe documentation  All medical record entries made by the Scribe were at my direction and personally dictated by me. I have reviewed the chart and agree that the record accurately reflects my personal performance of the history, physical exam, discussion and plan.    1. Hypertension, unspecified type        2. Cellulitis of lower  extremity, unspecified laterality               Electronically Signed By: Nadir Black MD   10/15/23  8:33 AM

## 2023-10-16 ENCOUNTER — NURSING HOME VISIT (OUTPATIENT)
Dept: INTERNAL MEDICINE | Facility: HOSPITAL | Age: 88
End: 2023-10-16
Payer: MEDICARE

## 2023-10-16 DIAGNOSIS — I10 PRIMARY HYPERTENSION: ICD-10-CM

## 2023-10-16 DIAGNOSIS — L03.115 CELLULITIS OF RIGHT LOWER EXTREMITY: Primary | ICD-10-CM

## 2023-10-16 DIAGNOSIS — R60.9 EDEMA, UNSPECIFIED TYPE: ICD-10-CM

## 2023-10-16 PROCEDURE — 99308 SBSQ NF CARE LOW MDM 20: CPT | Performed by: NURSE PRACTITIONER

## 2023-10-16 NOTE — ASSESSMENT & PLAN NOTE
Remains with extensive dependent edema in both legs  Encourage elevation  Ace wraps for light compression.

## 2023-10-16 NOTE — PROGRESS NOTES
PROGRESS NOTE    Subjective   Chief complaint: Mildred Nolen is a 89 y.o. female who is a long term care patient being seen and evaluated for follow up evaluation cellulitis right lower leg     HPI:  HPI   Is pleasant and talkative. Reports that her leg is feeling better, not as sore.   She reports that she keeps her legs elevated frequently throughout the day. Is currently in a wheelchair and self propels with her feet in her room       Objective   Vital signs: 128/72-76-18, 97.2     Physical Exam  Constitutional:       General: She is not in acute distress.  Eyes:      Extraocular Movements: Extraocular movements intact.   Cardiovascular:      Rate and Rhythm: Normal rate and regular rhythm.   Pulmonary:      Effort: Pulmonary effort is normal.      Breath sounds: Normal breath sounds.   Abdominal:      General: Bowel sounds are normal.      Palpations: Abdomen is soft.   Musculoskeletal:      Cervical back: Neck supple.      Right lower leg: No edema.      Left lower leg: No edema.      Comments: Has extensive lymphedema involving both legs and feet.   Is wearing ace wraps on both legs    Skin:     Comments: Right lower leg  Skin is intact. Remains with nicci discoloration  No inflammation  Nop warmth  No drainage    Neurological:      Mental Status: She is alert.   Psychiatric:         Mood and Affect: Mood normal.         Behavior: Behavior is cooperative.         Assessment/Plan   Problem List Items Addressed This Visit       HTN (hypertension)     BP stable. To continue with antihypertensive meds          Cellulitis of lower extremity - Primary     To continue with doxycycline  Has extensive lymphedema in lower leg  No inflammation  Remains with slight nicci discoloration   No temperature.   Less discomfort          Edema     Remains with extensive dependent edema in both legs  Encourage elevation  Ace wraps for light compression.              Medications, treatments, and labs reviewed  Continue medications  and treatments as listed in EMR    Lauren Cerna, APRN-CNP

## 2023-10-16 NOTE — ASSESSMENT & PLAN NOTE
To continue with doxycycline  Has extensive lymphedema in lower leg  No inflammation  Remains with slight nicci discoloration   No temperature.   Less discomfort

## 2023-10-20 ENCOUNTER — NURSING HOME VISIT (OUTPATIENT)
Dept: POST ACUTE CARE | Facility: EXTERNAL LOCATION | Age: 88
End: 2023-10-20
Payer: MEDICARE

## 2023-10-20 DIAGNOSIS — J44.9 CHRONIC OBSTRUCTIVE PULMONARY DISEASE, UNSPECIFIED COPD TYPE (MULTI): ICD-10-CM

## 2023-10-20 DIAGNOSIS — L03.115 CELLULITIS OF RIGHT LOWER EXTREMITY: ICD-10-CM

## 2023-10-20 DIAGNOSIS — I10 PRIMARY HYPERTENSION: Primary | ICD-10-CM

## 2023-10-20 PROCEDURE — 99308 SBSQ NF CARE LOW MDM 20: CPT | Performed by: INTERNAL MEDICINE

## 2023-10-20 NOTE — LETTER
Patient: Mildred Nolen  : 1930    Encounter Date: 10/20/2023    PROGRESS NOTE    Subjective  Chief complaint: Mildred Nolen is a 89 y.o. female who is a long term care patient being seen and evaluated for cellulitis    HPI:    Patient recently completed a 7 day course of antibiotics for cellulitis. However her RLE continues to be red and swollen, She denies pain but does endorse general discomfort. Denies SOB or cough. Denies chest pain or headache. No acute distress.       Objective  Vital signs: 129/66, 98%    Physical Exam  Constitutional:       General: She is not in acute distress.  Eyes:      Extraocular Movements: Extraocular movements intact.   Cardiovascular:      Rate and Rhythm: Normal rate and regular rhythm.   Pulmonary:      Effort: Pulmonary effort is normal.      Breath sounds: Normal breath sounds.   Abdominal:      General: Bowel sounds are normal.      Palpations: Abdomen is soft.   Musculoskeletal:      Cervical back: Neck supple.      Right lower leg: Edema present.      Left lower leg: Edema present.      Comments: RLE +redness   Neurological:      Mental Status: She is alert.   Psychiatric:         Mood and Affect: Mood normal.         Behavior: Behavior is cooperative.         Assessment/Plan  Problem List Items Addressed This Visit       HTN (hypertension) - Primary     BP stable. To continue with antihypertensive meds          COPD (chronic obstructive pulmonary disease) (CMS/[facility])     Lungs clear.   No wheezing   Denies any SOB          Cellulitis of lower extremity     To continue with doxycycline x 7 more days  Has extensive lymphedema in lower leg  No inflammation  Remains with slight nicci discoloration   No temperature.   Less discomfort           Medications, treatments, and labs reviewed  Continue medications and treatments as listed in [facility]    Scribe Attestation  By signing my name below, I, Joseph Steele   attest that this documentation has been prepared under the  direction and in the presence of Nadir Black MD.    Provider Attestation - Scribe documentation  All medical record entries made by the Scribe were at my direction and personally dictated by me. I have reviewed the chart and agree that the record accurately reflects my personal performance of the history, physical exam, discussion and plan.    1. Primary hypertension        2. Chronic obstructive pulmonary disease, unspecified COPD type (CMS/HCC)        3. Cellulitis of right lower extremity               Electronically Signed By: Nadir Black MD   10/20/23  5:19 PM

## 2023-10-20 NOTE — ASSESSMENT & PLAN NOTE
To continue with doxycycline x 7 more days  Has extensive lymphedema in lower leg  No inflammation  Remains with slight nicci discoloration   No temperature.   Less discomfort

## 2023-10-20 NOTE — PROGRESS NOTES
PROGRESS NOTE    Subjective   Chief complaint: Mildred Nolen is a 89 y.o. female who is a long term care patient being seen and evaluated for cellulitis    HPI:    Patient recently completed a 7 day course of antibiotics for cellulitis. However her RLE continues to be red and swollen, She denies pain but does endorse general discomfort. Denies SOB or cough. Denies chest pain or headache. No acute distress.       Objective   Vital signs: 129/66, 98%    Physical Exam  Constitutional:       General: She is not in acute distress.  Eyes:      Extraocular Movements: Extraocular movements intact.   Cardiovascular:      Rate and Rhythm: Normal rate and regular rhythm.   Pulmonary:      Effort: Pulmonary effort is normal.      Breath sounds: Normal breath sounds.   Abdominal:      General: Bowel sounds are normal.      Palpations: Abdomen is soft.   Musculoskeletal:      Cervical back: Neck supple.      Right lower leg: Edema present.      Left lower leg: Edema present.      Comments: RLE +redness   Neurological:      Mental Status: She is alert.   Psychiatric:         Mood and Affect: Mood normal.         Behavior: Behavior is cooperative.         Assessment/Plan   Problem List Items Addressed This Visit       HTN (hypertension) - Primary     BP stable. To continue with antihypertensive meds          COPD (chronic obstructive pulmonary disease) (CMS/[facility])     Lungs clear.   No wheezing   Denies any SOB          Cellulitis of lower extremity     To continue with doxycycline x 7 more days  Has extensive lymphedema in lower leg  No inflammation  Remains with slight nicci discoloration   No temperature.   Less discomfort           Medications, treatments, and labs reviewed  Continue medications and treatments as listed in [facility]    Scribe Attestation  By signing my name below, Tahmina CHRISTIANSON, Scribe   attest that this documentation has been prepared under the direction and in the presence of Nadir Black MD.    Provider  Attestation - Scribe documentation  All medical record entries made by the Scribe were at my direction and personally dictated by me. I have reviewed the chart and agree that the record accurately reflects my personal performance of the history, physical exam, discussion and plan.    1. Primary hypertension        2. Chronic obstructive pulmonary disease, unspecified COPD type (CMS/[facility])        3. Cellulitis of right lower extremity

## 2023-10-25 ENCOUNTER — NURSING HOME VISIT (OUTPATIENT)
Dept: POST ACUTE CARE | Facility: EXTERNAL LOCATION | Age: 88
End: 2023-10-25
Payer: MEDICARE

## 2023-10-25 DIAGNOSIS — J44.9 CHRONIC OBSTRUCTIVE PULMONARY DISEASE, UNSPECIFIED COPD TYPE (MULTI): ICD-10-CM

## 2023-10-25 DIAGNOSIS — L03.115 CELLULITIS OF RIGHT LOWER EXTREMITY: Primary | ICD-10-CM

## 2023-10-25 PROCEDURE — 99308 SBSQ NF CARE LOW MDM 20: CPT | Performed by: INTERNAL MEDICINE

## 2023-10-25 NOTE — LETTER
Patient: Mildred Nolen  : 1930    Encounter Date: 10/25/2023    PROGRESS NOTE    Subjective  Chief complaint: Mildred Nolen is a 89 y.o. female who is a long term care patient being seen and evaluated for cellulitis    HPI:  Patient continues on ATB for cellulitis. Denies increased pain or discomfort. Redness and edema improving. Denies SOB or cough. No acute distress.       Objective  Vital signs: 123/66, 98%    Physical Exam  Constitutional:       General: She is not in acute distress.  Eyes:      Extraocular Movements: Extraocular movements intact.   Cardiovascular:      Rate and Rhythm: Normal rate and regular rhythm.   Pulmonary:      Effort: Pulmonary effort is normal.      Breath sounds: Normal breath sounds.   Abdominal:      General: Bowel sounds are normal.      Palpations: Abdomen is soft.   Musculoskeletal:      Cervical back: Neck supple.      Right lower leg: Edema present.      Left lower leg: Edema present.   Skin:     Findings: Erythema present.   Neurological:      Mental Status: She is alert.   Psychiatric:         Mood and Affect: Mood normal.         Behavior: Behavior is cooperative.         Assessment/Plan  Problem List Items Addressed This Visit       COPD (chronic obstructive pulmonary disease) (CMS/[facility])     Lungs clear.   No wheezing   Denies any SOB          Cellulitis of lower extremity - Primary     To continue with doxycycline x 7 more days  Has extensive lymphedema in lower leg  No inflammation  Remains with slight nicci discoloration   No temperature.   Less discomfort           Medications, treatments, and labs reviewed  Continue medications and treatments as listed in [facility]    Scribe Attestation  By signing my name below, Tahmina CHRISTIANSON Scribe   attest that this documentation has been prepared under the direction and in the presence of Nadir Black MD.    Provider Attestation - Scribe documentation  All medical record entries made by the Scribe were at my direction and  personally dictated by me. I have reviewed the chart and agree that the record accurately reflects my personal performance of the history, physical exam, discussion and plan.    1. Cellulitis of right lower extremity        2. Chronic obstructive pulmonary disease, unspecified COPD type (CMS/[facility])               Electronically Signed By: Nadir Black MD   10/25/23  7:00 PM

## 2023-10-25 NOTE — PROGRESS NOTES
PROGRESS NOTE    Subjective   Chief complaint: Mildred Nolen is a 89 y.o. female who is a long term care patient being seen and evaluated for cellulitis    HPI:  Patient continues on ATB for cellulitis. Denies increased pain or discomfort. Redness and edema improving. Denies SOB or cough. No acute distress.       Objective   Vital signs: 123/66, 98%    Physical Exam  Constitutional:       General: She is not in acute distress.  Eyes:      Extraocular Movements: Extraocular movements intact.   Cardiovascular:      Rate and Rhythm: Normal rate and regular rhythm.   Pulmonary:      Effort: Pulmonary effort is normal.      Breath sounds: Normal breath sounds.   Abdominal:      General: Bowel sounds are normal.      Palpations: Abdomen is soft.   Musculoskeletal:      Cervical back: Neck supple.      Right lower leg: Edema present.      Left lower leg: Edema present.   Skin:     Findings: Erythema present.   Neurological:      Mental Status: She is alert.   Psychiatric:         Mood and Affect: Mood normal.         Behavior: Behavior is cooperative.         Assessment/Plan   Problem List Items Addressed This Visit       COPD (chronic obstructive pulmonary disease) (CMS/[facility])     Lungs clear.   No wheezing   Denies any SOB          Cellulitis of lower extremity - Primary     To continue with doxycycline x 7 more days  Has extensive lymphedema in lower leg  No inflammation  Remains with slight nicci discoloration   No temperature.   Less discomfort           Medications, treatments, and labs reviewed  Continue medications and treatments as listed in [facility]    Scribe Attestation  By signing my name below, I, Jospeh Steele   attest that this documentation has been prepared under the direction and in the presence of Nadir Black MD.    Provider Attestation - Scribe documentation  All medical record entries made by the Scribe were at my direction and personally dictated by me. I have reviewed the chart and agree that the  record accurately reflects my personal performance of the history, physical exam, discussion and plan.    1. Cellulitis of right lower extremity        2. Chronic obstructive pulmonary disease, unspecified COPD type (CMS/[facility])

## 2023-11-08 ENCOUNTER — NURSING HOME VISIT (OUTPATIENT)
Dept: POST ACUTE CARE | Facility: EXTERNAL LOCATION | Age: 88
End: 2023-11-08
Payer: MEDICARE

## 2023-11-08 DIAGNOSIS — K21.9 GASTROESOPHAGEAL REFLUX DISEASE WITHOUT ESOPHAGITIS: ICD-10-CM

## 2023-11-08 DIAGNOSIS — I10 PRIMARY HYPERTENSION: Primary | ICD-10-CM

## 2023-11-08 PROCEDURE — 99308 SBSQ NF CARE LOW MDM 20: CPT | Performed by: INTERNAL MEDICINE

## 2023-11-08 NOTE — PROGRESS NOTES
PROGRESS NOTE    Subjective   Chief complaint: Mildred Nolen is a 89 y.o. female who is a long term care patient being seen and evaluated for nausea    HPI:   patient recently started on scheduled Zofran due to complaints of nausea and indigestion.  Patient requesting PPI.  Denies vomiting, heartburn, cough or regurgitation. .  Denies chest pain or headache.  No acute distress.      Objective   Vital signs:   124/72, 98%    Physical Exam  Constitutional:       General: She is not in acute distress.     Appearance: She is obese.   Eyes:      Extraocular Movements: Extraocular movements intact.   Cardiovascular:      Rate and Rhythm: Normal rate and regular rhythm.   Pulmonary:      Effort: Pulmonary effort is normal.      Breath sounds: Normal breath sounds.   Abdominal:      General: Bowel sounds are normal.      Palpations: Abdomen is soft.   Musculoskeletal:      Cervical back: Neck supple.      Right lower leg: Edema present.      Left lower leg: Edema present.   Neurological:      Mental Status: She is alert.   Psychiatric:         Mood and Affect: Mood normal.         Behavior: Behavior is cooperative.         Assessment/Plan   Problem List Items Addressed This Visit       HTN (hypertension) - Primary     BP stable. To continue with antihypertensive meds          Gastroesophageal reflux disease without esophagitis       Continue Zofran for nausea.  Start Pepcid          Medications, treatments, and labs reviewed  Continue medications and treatments as listed in [facility]    Scribe Attestation  By signing my name below, I, Joseph Steele   attest that this documentation has been prepared under the direction and in the presence of Nadir Black MD.    Provider Attestation - Scribe documentation  All medical record entries made by the Scribe were at my direction and personally dictated by me. I have reviewed the chart and agree that the record accurately reflects my personal performance of the history, physical  exam, discussion and plan.    1. Primary hypertension        2. Gastroesophageal reflux disease without esophagitis

## 2023-11-08 NOTE — LETTER
Patient: Mildred Nolen  : 1930    Encounter Date: 2023    PROGRESS NOTE    Subjective  Chief complaint: Mildred Nolen is a 89 y.o. female who is a long term care patient being seen and evaluated for nausea    HPI:   patient recently started on scheduled Zofran due to complaints of nausea and indigestion.  Patient requesting PPI.  Denies vomiting, heartburn, cough or regurgitation. .  Denies chest pain or headache.  No acute distress.      Objective  Vital signs:   124/72, 98%    Physical Exam  Constitutional:       General: She is not in acute distress.     Appearance: She is obese.   Eyes:      Extraocular Movements: Extraocular movements intact.   Cardiovascular:      Rate and Rhythm: Normal rate and regular rhythm.   Pulmonary:      Effort: Pulmonary effort is normal.      Breath sounds: Normal breath sounds.   Abdominal:      General: Bowel sounds are normal.      Palpations: Abdomen is soft.   Musculoskeletal:      Cervical back: Neck supple.      Right lower leg: Edema present.      Left lower leg: Edema present.   Neurological:      Mental Status: She is alert.   Psychiatric:         Mood and Affect: Mood normal.         Behavior: Behavior is cooperative.         Assessment/Plan  Problem List Items Addressed This Visit       HTN (hypertension) - Primary     BP stable. To continue with antihypertensive meds          Gastroesophageal reflux disease without esophagitis       Continue Zofran for nausea.  Start Pepcid          Medications, treatments, and labs reviewed  Continue medications and treatments as listed in [facility]    Scribe Attestation  By signing my name below, ITahmina Scribe   attest that this documentation has been prepared under the direction and in the presence of Nadir Black MD.    Provider Attestation - Scribe documentation  All medical record entries made by the Scribe were at my direction and personally dictated by me. I have reviewed the chart and agree that the record  accurately reflects my personal performance of the history, physical exam, discussion and plan.    1. Primary hypertension        2. Gastroesophageal reflux disease without esophagitis               Electronically Signed By: Nadir Black MD   11/9/23 11:19 AM

## 2023-11-10 ENCOUNTER — NURSING HOME VISIT (OUTPATIENT)
Dept: POST ACUTE CARE | Facility: EXTERNAL LOCATION | Age: 88
End: 2023-11-10
Payer: MEDICARE

## 2023-11-10 DIAGNOSIS — I10 PRIMARY HYPERTENSION: Primary | ICD-10-CM

## 2023-11-10 DIAGNOSIS — J44.9 CHRONIC OBSTRUCTIVE PULMONARY DISEASE, UNSPECIFIED COPD TYPE (MULTI): ICD-10-CM

## 2023-11-10 DIAGNOSIS — K21.9 GASTROESOPHAGEAL REFLUX DISEASE WITHOUT ESOPHAGITIS: ICD-10-CM

## 2023-11-10 PROCEDURE — 99309 SBSQ NF CARE MODERATE MDM 30: CPT | Performed by: INTERNAL MEDICINE

## 2023-11-10 NOTE — LETTER
Patient: Mildred Nolen  : 1930    Encounter Date: 11/10/2023    PROGRESS NOTE    Subjective  Chief complaint: Mildred Nolen is a 89 y.o. female who is a long term care patient being seen and evaluated for monthly general medical care and follow-up.     HPI:   patient presents for general medical care and f/u.  Patient seen and examined at bedside.  No issues per nursing.  Patient has no acute complaints.    COPD stable, denies sob, wheezing, cough.  GERD controlled.  Denies heartburn, regurgitation, epigastric discomfort, sour taste, and cough. She recently started PPI and reports that she is feeling better.  HTN BP at goal.  Denies chest pain and headache.  No acute distress.       Objective  Vital signs: 127/76, 98%    Physical Exam  Constitutional:       General: She is not in acute distress.     Appearance: She is obese.   Eyes:      Extraocular Movements: Extraocular movements intact.   Cardiovascular:      Rate and Rhythm: Regular rhythm.   Pulmonary:      Effort: Pulmonary effort is normal.      Breath sounds: Normal breath sounds.   Abdominal:      General: Bowel sounds are normal.      Palpations: Abdomen is soft.   Musculoskeletal:         General: Normal range of motion.      Cervical back: Normal range of motion and neck supple.      Right lower leg: Edema present.      Left lower leg: Edema present.   Skin:     General: Skin is warm and dry.   Neurological:      General: No focal deficit present.      Mental Status: She is alert and oriented to person, place, and time. Mental status is at baseline.   Psychiatric:         Mood and Affect: Mood normal.         Behavior: Behavior normal. Behavior is cooperative.         Thought Content: Thought content normal.         Judgment: Judgment normal.         Assessment/Plan  Problem List Items Addressed This Visit       HTN (hypertension) - Primary     BP stable. To continue with antihypertensive meds          Gastroesophageal reflux disease without  esophagitis       Continue Zofran for nausea.  Continue PPI  monitor         COPD (chronic obstructive pulmonary disease) (CMS/[facility])     Lungs clear.   No wheezing   Denies any SOB         Medications, treatments, and labs reviewed  Continue medications and treatments as listed in [facility]    Scribe Attestation  By signing my name below, I, Joseph Steele   attest that this documentation has been prepared under the direction and in the presence of Nadir Black MD.    Provider Attestation - Scribe documentation  All medical record entries made by the Scribe were at my direction and personally dictated by me. I have reviewed the chart and agree that the record accurately reflects my personal performance of the history, physical exam, discussion and plan.  1. Primary hypertension        2. Gastroesophageal reflux disease without esophagitis        3. Chronic obstructive pulmonary disease, unspecified COPD type (CMS/[facility])              Electronically Signed By: Nadir Black MD   11/13/23  4:06 PM

## 2023-11-12 NOTE — PROGRESS NOTES
PROGRESS NOTE    Subjective   Chief complaint: Mildred Nolen is a 89 y.o. female who is a long term care patient being seen and evaluated for monthly general medical care and follow-up.     HPI:   patient presents for general medical care and f/u.  Patient seen and examined at bedside.  No issues per nursing.  Patient has no acute complaints.    COPD stable, denies sob, wheezing, cough.  GERD controlled.  Denies heartburn, regurgitation, epigastric discomfort, sour taste, and cough. She recently started PPI and reports that she is feeling better.  HTN BP at goal.  Denies chest pain and headache.  No acute distress.       Objective   Vital signs: 127/76, 98%    Physical Exam  Constitutional:       General: She is not in acute distress.     Appearance: She is obese.   Eyes:      Extraocular Movements: Extraocular movements intact.   Cardiovascular:      Rate and Rhythm: Regular rhythm.   Pulmonary:      Effort: Pulmonary effort is normal.      Breath sounds: Normal breath sounds.   Abdominal:      General: Bowel sounds are normal.      Palpations: Abdomen is soft.   Musculoskeletal:         General: Normal range of motion.      Cervical back: Normal range of motion and neck supple.      Right lower leg: Edema present.      Left lower leg: Edema present.   Skin:     General: Skin is warm and dry.   Neurological:      General: No focal deficit present.      Mental Status: She is alert and oriented to person, place, and time. Mental status is at baseline.   Psychiatric:         Mood and Affect: Mood normal.         Behavior: Behavior normal. Behavior is cooperative.         Thought Content: Thought content normal.         Judgment: Judgment normal.         Assessment/Plan   Problem List Items Addressed This Visit       HTN (hypertension) - Primary     BP stable. To continue with antihypertensive meds          Gastroesophageal reflux disease without esophagitis       Continue Zofran for nausea.  Continue PPI  monitor          COPD (chronic obstructive pulmonary disease) (CMS/[facility])     Lungs clear.   No wheezing   Denies any SOB         Medications, treatments, and labs reviewed  Continue medications and treatments as listed in [facility]    Scribe Attestation  By signing my name below, I, Joseph Steele   attest that this documentation has been prepared under the direction and in the presence of Nadir Black MD.    Provider Attestation - Scribe documentation  All medical record entries made by the Scribe were at my direction and personally dictated by me. I have reviewed the chart and agree that the record accurately reflects my personal performance of the history, physical exam, discussion and plan.  1. Primary hypertension        2. Gastroesophageal reflux disease without esophagitis        3. Chronic obstructive pulmonary disease, unspecified COPD type (CMS/[facility])

## 2023-12-06 ENCOUNTER — NURSING HOME VISIT (OUTPATIENT)
Dept: POST ACUTE CARE | Facility: EXTERNAL LOCATION | Age: 88
End: 2023-12-06
Payer: MEDICARE

## 2023-12-06 DIAGNOSIS — I10 PRIMARY HYPERTENSION: Primary | ICD-10-CM

## 2023-12-06 DIAGNOSIS — J44.9 CHRONIC OBSTRUCTIVE PULMONARY DISEASE, UNSPECIFIED COPD TYPE (MULTI): ICD-10-CM

## 2023-12-06 DIAGNOSIS — K21.9 GASTROESOPHAGEAL REFLUX DISEASE WITHOUT ESOPHAGITIS: ICD-10-CM

## 2023-12-06 PROCEDURE — 99309 SBSQ NF CARE MODERATE MDM 30: CPT | Performed by: INTERNAL MEDICINE

## 2023-12-06 NOTE — PROGRESS NOTES
PROGRESS NOTE    Subjective   Chief complaint: Mildred Nolen is a 89 y.o. female who is a long term care patient being seen and evaluated for monthly general medical care and follow-up. cough    HPI:   patient presents for general medical care and f/u.  Patient seen and examined at bedside.  No issues per nursing.  Patient has no acute complaints.    COPD stable, denies sob, wheezing, cough.  GERD controlled.  Denies heartburn, regurgitation, epigastric discomfort, sour taste, and cough.  HTN BP at goal.  Denies chest pain and headache.  No acute distress.       Objective   Vital signs: 127/76, 98%    Physical Exam  Constitutional:       General: She is not in acute distress.  Eyes:      Extraocular Movements: Extraocular movements intact.   Cardiovascular:      Rate and Rhythm: Regular rhythm.   Pulmonary:      Effort: Pulmonary effort is normal.      Breath sounds: Normal breath sounds.   Abdominal:      General: Bowel sounds are normal.      Palpations: Abdomen is soft.   Musculoskeletal:      Cervical back: Neck supple.      Right lower leg: Edema present.      Left lower leg: Edema present.   Neurological:      Mental Status: She is alert.   Psychiatric:         Mood and Affect: Mood normal.         Behavior: Behavior is cooperative.         Assessment/Plan   Problem List Items Addressed This Visit       HTN (hypertension) - Primary     BP stable. To continue with antihypertensive meds          Gastroesophageal reflux disease without esophagitis       Continue Zofran for nausea.  Continue PPI  monitor         COPD (chronic obstructive pulmonary disease) (CMS/[facility])     Lungs clear.   No wheezing   Denies any SOB         Medications, treatments, and labs reviewed  Continue medications and treatments as listed in [facility]    Scribe Attestation  By signing my name below, ITahmina, Scribe   attest that this documentation has been prepared under the direction and in the presence of Nadir Black MD.    Provider  Attestation - Scribe documentation  All medical record entries made by the Scribe were at my direction and personally dictated by me. I have reviewed the chart and agree that the record accurately reflects my personal performance of the history, physical exam, discussion and plan.

## 2023-12-06 NOTE — LETTER
Patient: Mildred Nolen  : 1930    Encounter Date: 2023    PROGRESS NOTE    Subjective  Chief complaint: Mildred Nolen is a 89 y.o. female who is a long term care patient being seen and evaluated for monthly general medical care and follow-up. cough    HPI:   patient presents for general medical care and f/u.  Patient seen and examined at bedside.  No issues per nursing.  Patient has no acute complaints.    COPD stable, denies sob, wheezing, cough.  GERD controlled.  Denies heartburn, regurgitation, epigastric discomfort, sour taste, and cough.  HTN BP at goal.  Denies chest pain and headache.  No acute distress.       Objective  Vital signs: 127/76, 98%    Physical Exam  Constitutional:       General: She is not in acute distress.  Eyes:      Extraocular Movements: Extraocular movements intact.   Cardiovascular:      Rate and Rhythm: Regular rhythm.   Pulmonary:      Effort: Pulmonary effort is normal.      Breath sounds: Normal breath sounds.   Abdominal:      General: Bowel sounds are normal.      Palpations: Abdomen is soft.   Musculoskeletal:      Cervical back: Neck supple.      Right lower leg: Edema present.      Left lower leg: Edema present.   Neurological:      Mental Status: She is alert.   Psychiatric:         Mood and Affect: Mood normal.         Behavior: Behavior is cooperative.         Assessment/Plan  Problem List Items Addressed This Visit       HTN (hypertension) - Primary     BP stable. To continue with antihypertensive meds          Gastroesophageal reflux disease without esophagitis       Continue Zofran for nausea.  Continue PPI  monitor         COPD (chronic obstructive pulmonary disease) (CMS/[facility])     Lungs clear.   No wheezing   Denies any SOB         Medications, treatments, and labs reviewed  Continue medications and treatments as listed in [facility]    Scribe Attestation  By signing my name below, I, Tahmina Landaverde, Darlynibyomaira   attest that this documentation has been prepared under the  direction and in the presence of Nadir Black MD.    Provider Attestation - Scribe documentation  All medical record entries made by the Scribe were at my direction and personally dictated by me. I have reviewed the chart and agree that the record accurately reflects my personal performance of the history, physical exam, discussion and plan.        Electronically Signed By: Nadir Black MD   12/6/23  5:13 PM

## 2024-01-05 ENCOUNTER — NURSING HOME VISIT (OUTPATIENT)
Dept: POST ACUTE CARE | Facility: EXTERNAL LOCATION | Age: 89
End: 2024-01-05
Payer: MEDICARE

## 2024-01-05 DIAGNOSIS — K21.9 GASTROESOPHAGEAL REFLUX DISEASE WITHOUT ESOPHAGITIS: ICD-10-CM

## 2024-01-05 DIAGNOSIS — I10 PRIMARY HYPERTENSION: ICD-10-CM

## 2024-01-05 DIAGNOSIS — L98.9 SORE ON LEG: Primary | ICD-10-CM

## 2024-01-05 DIAGNOSIS — J44.9 CHRONIC OBSTRUCTIVE PULMONARY DISEASE, UNSPECIFIED COPD TYPE (MULTI): ICD-10-CM

## 2024-01-05 PROCEDURE — 99308 SBSQ NF CARE LOW MDM 20: CPT | Performed by: INTERNAL MEDICINE

## 2024-01-05 NOTE — LETTER
Patient: Mildred Nolen  : 1930    Encounter Date: 2024    PROGRESS NOTE    Subjective  Chief complaint: Mildred Nolen is a 89 y.o. female who is a long term care patient being seen and evaluated for open area on right leg    HPI:  HPI  Patient presents for general medical care and f/u.  Patient seen and examined at bedside. Patient is seen due to reports of patient with a small open area on back of right thigh, it is dry, no drainage no odor.  Patient also had complaints of indigestion/heartburn, patient is on Pepcid, start probiotic.  HTN BP at goal.  Denies chest pain and headache.  COPD stable, denies sob, wheezing, cough.  GERD controlled.  Denies heartburn, regurgitation, epigastric discomfort, sour taste, and cough.  Patient in no acute distress.    Objective  Vital signs:     Physical Exam  Constitutional:       General: She is not in acute distress.  Eyes:      Extraocular Movements: Extraocular movements intact.   Cardiovascular:      Rate and Rhythm: Regular rhythm.   Pulmonary:      Effort: Pulmonary effort is normal.      Breath sounds: Normal breath sounds.   Abdominal:      General: Bowel sounds are normal.      Palpations: Abdomen is soft.   Musculoskeletal:      Cervical back: Neck supple.      Right lower leg: Edema present.      Left lower leg: Edema present.   Skin:     Comments: Small open area back of right thigh, dry, no drainage, no odor   Neurological:      Mental Status: She is alert.   Psychiatric:         Mood and Affect: Mood normal.         Behavior: Behavior is cooperative.         Assessment/Plan  Problem List Items Addressed This Visit       HTN (hypertension)     Monitor BP   continue with antihypertensive meds          Gastroesophageal reflux disease without esophagitis       Continue Zofran for nausea.  Continue PPI  Start probiotic  monitor         COPD (chronic obstructive pulmonary disease) (CMS/[facility])     Lungs clear.   No wheezing   Denies any SOB          Sore on  leg - Primary     With a small sore on back of right thigh  Wound consult  Cleanse with normal saline, pat dry, apply mupirocin ointment, cover with foam dressing            Medications, treatments, and labs reviewed  Continue medications and treatments as listed in EMR    Scribe Attestation  IAna Scribe   attest that this documentation has been prepared under the direction and in the presence of Nadir Black MD    Provider Attestation - Scribe documentation  All medical record entries made by the Scribe were at my direction and personally dictated by me. I have reviewed the chart and agree that the record accurately reflects my personal performance of the history, physical exam, discussion and plan.   Nadir Black MD            Electronically Signed By: Nadir Black MD   1/5/24  4:25 PM

## 2024-01-05 NOTE — PROGRESS NOTES
PROGRESS NOTE    Subjective   Chief complaint: Mildred Nolen is a 89 y.o. female who is a long term care patient being seen and evaluated for open area on right leg    HPI:  HPI  Patient presents for general medical care and f/u.  Patient seen and examined at bedside. Patient is seen due to reports of patient with a small open area on back of right thigh, it is dry, no drainage no odor.  Patient also had complaints of indigestion/heartburn, patient is on Pepcid, start probiotic.  HTN BP at goal.  Denies chest pain and headache.  COPD stable, denies sob, wheezing, cough.  GERD controlled.  Denies heartburn, regurgitation, epigastric discomfort, sour taste, and cough.  Patient in no acute distress.    Objective   Vital signs:     Physical Exam  Constitutional:       General: She is not in acute distress.  Eyes:      Extraocular Movements: Extraocular movements intact.   Cardiovascular:      Rate and Rhythm: Regular rhythm.   Pulmonary:      Effort: Pulmonary effort is normal.      Breath sounds: Normal breath sounds.   Abdominal:      General: Bowel sounds are normal.      Palpations: Abdomen is soft.   Musculoskeletal:      Cervical back: Neck supple.      Right lower leg: Edema present.      Left lower leg: Edema present.   Skin:     Comments: Small open area back of right thigh, dry, no drainage, no odor   Neurological:      Mental Status: She is alert.   Psychiatric:         Mood and Affect: Mood normal.         Behavior: Behavior is cooperative.         Assessment/Plan   Problem List Items Addressed This Visit       HTN (hypertension)     Monitor BP   continue with antihypertensive meds          Gastroesophageal reflux disease without esophagitis       Continue Zofran for nausea.  Continue PPI  Start probiotic  monitor         COPD (chronic obstructive pulmonary disease) (CMS/[facility])     Lungs clear.   No wheezing   Denies any SOB          Sore on leg - Primary     With a small sore on back of right thigh  Wound  consult  Cleanse with normal saline, pat dry, apply mupirocin ointment, cover with foam dressing            Medications, treatments, and labs reviewed  Continue medications and treatments as listed in EMR    Scribe Attestation  I, Joseph Landis   attest that this documentation has been prepared under the direction and in the presence of Nadir Black MD    Provider Attestation - Scribe documentation  All medical record entries made by the Scribe were at my direction and personally dictated by me. I have reviewed the chart and agree that the record accurately reflects my personal performance of the history, physical exam, discussion and plan.   Nadir Black MD

## 2024-01-05 NOTE — ASSESSMENT & PLAN NOTE
With a small sore on back of right thigh  Wound consult  Cleanse with normal saline, pat dry, apply mupirocin ointment, cover with foam dressing

## 2024-01-29 ENCOUNTER — NURSING HOME VISIT (OUTPATIENT)
Dept: POST ACUTE CARE | Facility: EXTERNAL LOCATION | Age: 89
End: 2024-01-29
Payer: MEDICARE

## 2024-01-29 DIAGNOSIS — L03.119 CELLULITIS OF LOWER EXTREMITY, UNSPECIFIED LATERALITY: Primary | ICD-10-CM

## 2024-01-29 PROCEDURE — 99308 SBSQ NF CARE LOW MDM 20: CPT | Performed by: NURSE PRACTITIONER

## 2024-01-29 NOTE — LETTER
Patient: Mildred Nolen  : 1930    Encounter Date: 2024    PROGRESS NOTE    Subjective  Chief complaint: Mildred Nolen is a 89 y.o. female who is a long term care patient being seen and evaluated for reported cellulitis in her legs     HPI: Was started on antibiotic for cellulitis in her legs. Both legs are currently wrapped with ace wraps. Remains with extensive lymphedema. She reports that he legs are no longer burning with the antibiotic. Remains afebrile   HPI      Objective  Vital signs: 126/73-82-19-97.1     Physical Exam  Vitals and nursing note reviewed.   Constitutional:       General: She is not in acute distress.     Appearance: She is well-groomed. She is obese.   Eyes:      Extraocular Movements: Extraocular movements intact.   Cardiovascular:      Comments: Lower legs are wrapped with ace wraps  Has extensive lymphedema    Toes are warm to touch with good coloration   Pulmonary:      Effort: Pulmonary effort is normal.      Comments: Lungs clear   Musculoskeletal:      Cervical back: Neck supple.   Neurological:      Mental Status: She is alert.   Psychiatric:         Mood and Affect: Mood normal.         Behavior: Behavior is cooperative.         Assessment/Plan  Problem List Items Addressed This Visit       Cellulitis of lower extremity - Primary     To continue with doxycycline   Has extensive lymphedema in both lower leg    She reports that her legs are no longer burning with antibiotic. Advised elevation   She refused to allow ace wraps to be removed to visualize her legs at this visit.   Reports that her skin is intact.   No inflammation evident.   Afebrile                 Medications, treatments, and labs reviewed  Continue medications and treatments as listed in EMR    ELIZABETH Howard      Electronically Signed By: ELIZABETH Howard   24  7:49 PM

## 2024-01-30 NOTE — PROGRESS NOTES
PROGRESS NOTE    Subjective   Chief complaint: Mildred Nolen is a 89 y.o. female who is a long term care patient being seen and evaluated for reported cellulitis in her legs     HPI: Was started on antibiotic for cellulitis in her legs. Both legs are currently wrapped with ace wraps. Remains with extensive lymphedema. She reports that he legs are no longer burning with the antibiotic. Remains afebrile   HPI      Objective   Vital signs: 126/73-82-19-97.1     Physical Exam  Vitals and nursing note reviewed.   Constitutional:       General: She is not in acute distress.     Appearance: She is well-groomed. She is obese.   Eyes:      Extraocular Movements: Extraocular movements intact.   Cardiovascular:      Comments: Lower legs are wrapped with ace wraps  Has extensive lymphedema    Toes are warm to touch with good coloration   Pulmonary:      Effort: Pulmonary effort is normal.      Comments: Lungs clear   Musculoskeletal:      Cervical back: Neck supple.   Neurological:      Mental Status: She is alert.   Psychiatric:         Mood and Affect: Mood normal.         Behavior: Behavior is cooperative.         Assessment/Plan   Problem List Items Addressed This Visit       Cellulitis of lower extremity - Primary     To continue with doxycycline   Has extensive lymphedema in both lower leg    She reports that her legs are no longer burning with antibiotic. Advised elevation   She refused to allow ace wraps to be removed to visualize her legs at this visit.   Reports that her skin is intact.   No inflammation evident.   Afebrile                 Medications, treatments, and labs reviewed  Continue medications and treatments as listed in EMR    Lauren Cerna, APRN-CNP     Patient requests all Lab, Cardiology, and Radiology Results on their Discharge Instructions

## 2024-01-30 NOTE — ASSESSMENT & PLAN NOTE
To continue with doxycycline   Has extensive lymphedema in both lower leg    She reports that her legs are no longer burning with antibiotic. Advised elevation   She refused to allow ace wraps to be removed to visualize her legs at this visit.   Reports that her skin is intact.   No inflammation evident.   Afebrile

## 2024-02-02 ENCOUNTER — NURSING HOME VISIT (OUTPATIENT)
Dept: POST ACUTE CARE | Facility: EXTERNAL LOCATION | Age: 89
End: 2024-02-02
Payer: MEDICARE

## 2024-02-02 DIAGNOSIS — J06.9 UPPER RESPIRATORY TRACT INFECTION, UNSPECIFIED TYPE: Primary | ICD-10-CM

## 2024-02-02 DIAGNOSIS — L03.115 CELLULITIS OF RIGHT LOWER EXTREMITY: ICD-10-CM

## 2024-02-02 PROCEDURE — 99308 SBSQ NF CARE LOW MDM 20: CPT | Performed by: INTERNAL MEDICINE

## 2024-02-02 NOTE — LETTER
Patient: Mildred Nolen  : 1930    Encounter Date: 2024    PROGRESS NOTE    Subjective  Chief complaint: Mildred Nolen is a 89 y.o. female who is a long term care patient being seen and evaluated for severe congestion.    HPI:  HPI  Patient is seen due to patient reporting severe congestion, runny nose, postnasal drip, headache, nonproductive cough.  Patient was seen and examined at bedside.  Patient is afebrile at this time.  Patient was tested for COVID and negative.  Patient also continues on doxycycline for lower extremity cellulitis.  Patient is tolerating antibiotic without adverse effect.    Objective  Vital signs: 106/56, 97.6, 56, 18, 98%    Physical Exam  Constitutional:       General: She is not in acute distress.  Eyes:      Extraocular Movements: Extraocular movements intact.   Cardiovascular:      Rate and Rhythm: Regular rhythm.   Pulmonary:      Effort: Pulmonary effort is normal.      Breath sounds: Normal breath sounds.   Abdominal:      General: Bowel sounds are normal.      Palpations: Abdomen is soft.   Musculoskeletal:      Cervical back: Neck supple.      Right lower leg: Edema present.      Left lower leg: Edema present.   Neurological:      Mental Status: She is alert.   Psychiatric:         Mood and Affect: Mood normal.         Behavior: Behavior is cooperative.         Assessment/Plan  Problem List Items Addressed This Visit       Cellulitis of lower extremity     Continue doxycycline until complete, 2024  Lymphedema to lower extremities         URI (upper respiratory infection) - Primary     Mucinex  Claritin  Flonase  Obtain chest x-ray.    Continues on Doxy          Medications, treatments, and labs reviewed  Continue medications and treatments as listed in EMR    Scribe Attestation  I, Joseph Landis   attest that this documentation has been prepared under the direction and in the presence of Nadir Black MD    Provider Attestation - Scribe documentation  All  medical record entries made by the Scribe were at my direction and personally dictated by me. I have reviewed the chart and agree that the record accurately reflects my personal performance of the history, physical exam, discussion and plan.   Nadir Black MD            Electronically Signed By: Nadir Black MD   2/2/24  4:42 PM

## 2024-02-02 NOTE — PROGRESS NOTES
PROGRESS NOTE    Subjective   Chief complaint: Mildred Nolen is a 89 y.o. female who is a long term care patient being seen and evaluated for severe congestion.    HPI:  HPI  Patient is seen due to patient reporting severe congestion, runny nose, postnasal drip, headache, nonproductive cough.  Patient was seen and examined at bedside.  Patient is afebrile at this time.  Patient was tested for COVID and negative.  Patient also continues on doxycycline for lower extremity cellulitis.  Patient is tolerating antibiotic without adverse effect.    Objective   Vital signs: 106/56, 97.6, 56, 18, 98%    Physical Exam  Constitutional:       General: She is not in acute distress.  Eyes:      Extraocular Movements: Extraocular movements intact.   Cardiovascular:      Rate and Rhythm: Regular rhythm.   Pulmonary:      Effort: Pulmonary effort is normal.      Breath sounds: Normal breath sounds.   Abdominal:      General: Bowel sounds are normal.      Palpations: Abdomen is soft.   Musculoskeletal:      Cervical back: Neck supple.      Right lower leg: Edema present.      Left lower leg: Edema present.   Neurological:      Mental Status: She is alert.   Psychiatric:         Mood and Affect: Mood normal.         Behavior: Behavior is cooperative.         Assessment/Plan   Problem List Items Addressed This Visit       Cellulitis of lower extremity     Continue doxycycline until complete, 2/5/2024  Lymphedema to lower extremities         URI (upper respiratory infection) - Primary     Mucinex  Claritin  Flonase  Obtain chest x-ray.    Continues on Doxy          Medications, treatments, and labs reviewed  Continue medications and treatments as listed in EMR    Scribe Attestation  I, Joseph Landis   attest that this documentation has been prepared under the direction and in the presence of Nadir Black MD    Provider Attestation - Scribe documentation  All medical record entries made by the Scribe were at my direction and  personally dictated by me. I have reviewed the chart and agree that the record accurately reflects my personal performance of the history, physical exam, discussion and plan.   Nadir Black MD

## 2024-02-07 ENCOUNTER — NURSING HOME VISIT (OUTPATIENT)
Dept: POST ACUTE CARE | Facility: EXTERNAL LOCATION | Age: 89
End: 2024-02-07
Payer: MEDICARE

## 2024-02-07 DIAGNOSIS — J44.9 CHRONIC OBSTRUCTIVE PULMONARY DISEASE, UNSPECIFIED COPD TYPE (MULTI): ICD-10-CM

## 2024-02-07 DIAGNOSIS — J06.9 UPPER RESPIRATORY TRACT INFECTION, UNSPECIFIED TYPE: Primary | ICD-10-CM

## 2024-02-07 DIAGNOSIS — I10 PRIMARY HYPERTENSION: ICD-10-CM

## 2024-02-07 DIAGNOSIS — K21.9 GASTROESOPHAGEAL REFLUX DISEASE WITHOUT ESOPHAGITIS: ICD-10-CM

## 2024-02-07 PROCEDURE — 99309 SBSQ NF CARE MODERATE MDM 30: CPT | Performed by: INTERNAL MEDICINE

## 2024-02-07 NOTE — LETTER
Patient: Mildred Nolen  : 1930    Encounter Date: 2024    PROGRESS NOTE    Subjective  Chief complaint: Mildred Nolen is a 89 y.o. female who is a long term care patient being seen and evaluated for monthly general medical care and follow-up and cough.    HPI:  HPI  Patient presents for general medical care and f/u.  Patient seen and examined at bedside.  Patient recently completed treatment for URI, continues with cough and congestion.  Patient to continue aerosol treatment Mucinex and to start Claritin, Flonase for 10 days and dexamethasone for 5 days.  HTN BP at goal.  Denies chest pain and headache.  COPD stable, denies sob, wheezing, cough.  GERD controlled.  Denies heartburn, regurgitation, epigastric discomfort, sour taste, and cough.  Patient in no acute distress.  Afebrile.    Objective  Vital signs: 124/84, 97.67 64, 20    Physical Exam  Constitutional:       General: She is not in acute distress.  Eyes:      Extraocular Movements: Extraocular movements intact.   Cardiovascular:      Rate and Rhythm: Regular rhythm.   Pulmonary:      Effort: Pulmonary effort is normal.      Breath sounds: Normal breath sounds.   Abdominal:      General: Bowel sounds are normal.      Palpations: Abdomen is soft.   Musculoskeletal:      Cervical back: Neck supple.      Right lower leg: Edema present.      Left lower leg: Edema present.   Neurological:      Mental Status: She is alert.   Psychiatric:         Mood and Affect: Mood normal.         Behavior: Behavior is cooperative.         Assessment/Plan  Problem List Items Addressed This Visit       HTN (hypertension)     Monitor BP   continue with antihypertensive meds          Gastroesophageal reflux disease without esophagitis       Continue Zofran for nausea.  Continue PPI  Start probiotic  monitor         COPD (chronic obstructive pulmonary disease) (CMS/[facility])     Lungs clear.   No wheezing   Denies any SOB          URI (upper respiratory infection) - Primary      Flonase  Claritin  Mucinex  Dexamethasone          Medications, treatments, and labs reviewed  Continue medications and treatments as listed in EMR    Scribe Attestation  I, Joseph Landis   attest that this documentation has been prepared under the direction and in the presence of Nadir Black MD    Provider Attestation - Scribe documentation  All medical record entries made by the Scribe were at my direction and personally dictated by me. I have reviewed the chart and agree that the record accurately reflects my personal performance of the history, physical exam, discussion and plan.   Nadir Black MD            Electronically Signed By: Nadir Black MD   2/8/24  1:46 PM

## 2024-02-08 NOTE — PROGRESS NOTES
PROGRESS NOTE    Subjective   Chief complaint: Mildred Nolen is a 89 y.o. female who is a long term care patient being seen and evaluated for monthly general medical care and follow-up and cough.    HPI:  HPI  Patient presents for general medical care and f/u.  Patient seen and examined at bedside.  Patient recently completed treatment for URI, continues with cough and congestion.  Patient to continue aerosol treatment Mucinex and to start Claritin, Flonase for 10 days and dexamethasone for 5 days.  HTN BP at goal.  Denies chest pain and headache.  COPD stable, denies sob, wheezing, cough.  GERD controlled.  Denies heartburn, regurgitation, epigastric discomfort, sour taste, and cough.  Patient in no acute distress.  Afebrile.    Objective   Vital signs: 124/84, 97.67 64, 20    Physical Exam  Constitutional:       General: She is not in acute distress.  Eyes:      Extraocular Movements: Extraocular movements intact.   Cardiovascular:      Rate and Rhythm: Regular rhythm.   Pulmonary:      Effort: Pulmonary effort is normal.      Breath sounds: Normal breath sounds.   Abdominal:      General: Bowel sounds are normal.      Palpations: Abdomen is soft.   Musculoskeletal:      Cervical back: Neck supple.      Right lower leg: Edema present.      Left lower leg: Edema present.   Neurological:      Mental Status: She is alert.   Psychiatric:         Mood and Affect: Mood normal.         Behavior: Behavior is cooperative.         Assessment/Plan   Problem List Items Addressed This Visit       HTN (hypertension)     Monitor BP   continue with antihypertensive meds          Gastroesophageal reflux disease without esophagitis       Continue Zofran for nausea.  Continue PPI  Start probiotic  monitor         COPD (chronic obstructive pulmonary disease) (CMS/HCC)     Lungs clear.   No wheezing   Denies any SOB          URI (upper respiratory infection) - Primary     Flonase  Claritin  Mucinex  Dexamethasone          Medications,  treatments, and labs reviewed  Continue medications and treatments as listed in EMR    Scribe Attestation  I, Darlyn Landisibyomaira   attest that this documentation has been prepared under the direction and in the presence of Nadir Black MD    Provider Attestation - Scribe documentation  All medical record entries made by the Scribe were at my direction and personally dictated by me. I have reviewed the chart and agree that the record accurately reflects my personal performance of the history, physical exam, discussion and plan.   Nadir Black MD

## 2024-02-09 ENCOUNTER — NURSING HOME VISIT (OUTPATIENT)
Dept: POST ACUTE CARE | Facility: EXTERNAL LOCATION | Age: 89
End: 2024-02-09
Payer: MEDICARE

## 2024-02-09 DIAGNOSIS — J06.9 UPPER RESPIRATORY TRACT INFECTION, UNSPECIFIED TYPE: Primary | ICD-10-CM

## 2024-02-09 DIAGNOSIS — I10 PRIMARY HYPERTENSION: ICD-10-CM

## 2024-02-09 DIAGNOSIS — J44.9 CHRONIC OBSTRUCTIVE PULMONARY DISEASE, UNSPECIFIED COPD TYPE (MULTI): ICD-10-CM

## 2024-02-09 PROCEDURE — 99308 SBSQ NF CARE LOW MDM 20: CPT | Performed by: INTERNAL MEDICINE

## 2024-02-09 NOTE — PROGRESS NOTES
PROGRESS NOTE    Subjective   Chief complaint: Mildred Nolen is a 89 y.o. female who is a long term care patient being seen and evaluated for follow-up of URI    HPI:  HPI  Patient is seen in follow-up of URI, was started on Flonase, Claritin, Mucinex, dexamethasone and antibiotic which was completed.  Patient is reported to be feeling better.  Patient seen and examined at bedside, appears to be in no apparent distress.  Denies chest pain or shortness of breath.  Afebrile at this time.    Objective   Vital signs: 133/70, 62    Physical Exam  Constitutional:       General: She is not in acute distress.  Eyes:      Extraocular Movements: Extraocular movements intact.   Cardiovascular:      Rate and Rhythm: Regular rhythm.   Pulmonary:      Effort: Pulmonary effort is normal.      Breath sounds: Normal breath sounds.   Abdominal:      General: Bowel sounds are normal.      Palpations: Abdomen is soft.   Musculoskeletal:      Cervical back: Neck supple.      Right lower leg: Edema present.      Left lower leg: Edema present.   Neurological:      Mental Status: She is alert.   Psychiatric:         Mood and Affect: Mood normal.         Behavior: Behavior is cooperative.         Assessment/Plan   Problem List Items Addressed This Visit       HTN (hypertension)     Monitor BP   continue with antihypertensive meds          COPD (chronic obstructive pulmonary disease) (CMS/[facility])     Lungs clear.   No wheezing   Denies any SOB          URI (upper respiratory infection) - Primary     Flonase  Claritin  Mucinex  Dexamethasone until completed 2/11/2024          Medications, treatments, and labs reviewed  Continue medications and treatments as listed in EMR    Scribe Attestation  I, Joseph Landis   attest that this documentation has been prepared under the direction and in the presence of Nadir Black MD    Provider Attestation - Scribe documentation  All medical record entries made by the Scribe were at my direction  and personally dictated by me. I have reviewed the chart and agree that the record accurately reflects my personal performance of the history, physical exam, discussion and plan.   Nadir Black MD

## 2024-02-09 NOTE — LETTER
Patient: Mildred Nolen  : 1930    Encounter Date: 2024    PROGRESS NOTE    Subjective  Chief complaint: Mildred Nolen is a 89 y.o. female who is a long term care patient being seen and evaluated for follow-up of URI    HPI:  HPI  Patient is seen in follow-up of URI, was started on Flonase, Claritin, Mucinex, dexamethasone and antibiotic which was completed.  Patient is reported to be feeling better.  Patient seen and examined at bedside, appears to be in no apparent distress.  Denies chest pain or shortness of breath.  Afebrile at this time.    Objective  Vital signs: 133/70, 62    Physical Exam  Constitutional:       General: She is not in acute distress.  Eyes:      Extraocular Movements: Extraocular movements intact.   Cardiovascular:      Rate and Rhythm: Regular rhythm.   Pulmonary:      Effort: Pulmonary effort is normal.      Breath sounds: Normal breath sounds.   Abdominal:      General: Bowel sounds are normal.      Palpations: Abdomen is soft.   Musculoskeletal:      Cervical back: Neck supple.      Right lower leg: Edema present.      Left lower leg: Edema present.   Neurological:      Mental Status: She is alert.   Psychiatric:         Mood and Affect: Mood normal.         Behavior: Behavior is cooperative.         Assessment/Plan  Problem List Items Addressed This Visit       HTN (hypertension)     Monitor BP   continue with antihypertensive meds          COPD (chronic obstructive pulmonary disease) (CMS/[facility])     Lungs clear.   No wheezing   Denies any SOB          URI (upper respiratory infection) - Primary     Flonase  Claritin  Mucinex  Dexamethasone until completed 2024          Medications, treatments, and labs reviewed  Continue medications and treatments as listed in EMR    Scribe Attestation  I, Joseph Landis   attest that this documentation has been prepared under the direction and in the presence of Nadir Black MD    Provider Attestation - Joseph  documentation  All medical record entries made by the Scribe were at my direction and personally dictated by me. I have reviewed the chart and agree that the record accurately reflects my personal performance of the history, physical exam, discussion and plan.   Nadir Black MD            Electronically Signed By: Nadir Black MD   2/10/24  9:38 AM

## 2024-02-14 ENCOUNTER — NURSING HOME VISIT (OUTPATIENT)
Dept: POST ACUTE CARE | Facility: EXTERNAL LOCATION | Age: 89
End: 2024-02-14
Payer: MEDICARE

## 2024-02-14 DIAGNOSIS — J06.9 UPPER RESPIRATORY TRACT INFECTION, UNSPECIFIED TYPE: Primary | ICD-10-CM

## 2024-02-14 DIAGNOSIS — I10 PRIMARY HYPERTENSION: ICD-10-CM

## 2024-02-14 DIAGNOSIS — J44.9 CHRONIC OBSTRUCTIVE PULMONARY DISEASE, UNSPECIFIED COPD TYPE (MULTI): ICD-10-CM

## 2024-02-14 PROCEDURE — 99309 SBSQ NF CARE MODERATE MDM 30: CPT | Performed by: INTERNAL MEDICINE

## 2024-02-14 NOTE — LETTER
Patient: Mildred Nolen  : 1930    Encounter Date: 2024    PROGRESS NOTE    Subjective  Chief complaint: Mildred Nolen is a 89 y.o. female who is a long term care patient being seen and evaluated for follow-up of URI.    HPI:  HPI  Patient is seen in follow-up of URI.  Patient just completed antibiotics, tolerated well.  Patient still with complaints of cough and phlegm production.  Patient was started on Z-Olu and Medrol Dosepak and Tessalon Perles x 7 days.  Patient seen and examined at bedside, appears to be in no apparent distress.  Patient remains afebrile today.    Objective  Vital signs: 135/73, 60    Physical Exam  Constitutional:       General: She is not in acute distress.  Eyes:      Extraocular Movements: Extraocular movements intact.   Cardiovascular:      Rate and Rhythm: Regular rhythm.   Pulmonary:      Effort: Pulmonary effort is normal.      Breath sounds: Normal breath sounds.   Abdominal:      General: Bowel sounds are normal.      Palpations: Abdomen is soft.   Musculoskeletal:      Cervical back: Neck supple.      Right lower leg: Edema present.      Left lower leg: Edema present.   Neurological:      Mental Status: She is alert.   Psychiatric:         Mood and Affect: Mood normal.         Behavior: Behavior is cooperative.         Assessment/Plan  Problem List Items Addressed This Visit       HTN (hypertension)     Monitor BP   continue with antihypertensive meds          COPD (chronic obstructive pulmonary disease) (CMS/[facility])     Lungs clear.   No wheezing   Denies any SOB          URI (upper respiratory infection) - Primary     Z-Olu, Medrol, Tessalon Perles x 7 days  Continue to monitor          Medications, treatments, and labs reviewed  Continue medications and treatments as listed in EMR    Scribe Attestation  I, Ana Grimm, Scribe   attest that this documentation has been prepared under the direction and in the presence of Nadir Black MD    Provider Attestation -  Scribe documentation  All medical record entries made by the Scribe were at my direction and personally dictated by me. I have reviewed the chart and agree that the record accurately reflects my personal performance of the history, physical exam, discussion and plan.   Nadir Black MD            Electronically Signed By: Nadir Black MD   2/14/24  5:38 PM

## 2024-02-14 NOTE — PROGRESS NOTES
PROGRESS NOTE    Subjective   Chief complaint: Mildred Nolen is a 89 y.o. female who is a long term care patient being seen and evaluated for follow-up of URI.    HPI:  HPI  Patient is seen in follow-up of URI.  Patient just completed antibiotics, tolerated well.  Patient still with complaints of cough and phlegm production.  Patient was started on Z-Olu and Medrol Dosepak and Tessalon Perles x 7 days.  Patient seen and examined at bedside, appears to be in no apparent distress.  Patient remains afebrile today.    Objective   Vital signs: 135/73, 60    Physical Exam  Constitutional:       General: She is not in acute distress.  Eyes:      Extraocular Movements: Extraocular movements intact.   Cardiovascular:      Rate and Rhythm: Regular rhythm.   Pulmonary:      Effort: Pulmonary effort is normal.      Breath sounds: Normal breath sounds.   Abdominal:      General: Bowel sounds are normal.      Palpations: Abdomen is soft.   Musculoskeletal:      Cervical back: Neck supple.      Right lower leg: Edema present.      Left lower leg: Edema present.   Neurological:      Mental Status: She is alert.   Psychiatric:         Mood and Affect: Mood normal.         Behavior: Behavior is cooperative.         Assessment/Plan   Problem List Items Addressed This Visit       HTN (hypertension)     Monitor BP   continue with antihypertensive meds          COPD (chronic obstructive pulmonary disease) (CMS/[facility])     Lungs clear.   No wheezing   Denies any SOB          URI (upper respiratory infection) - Primary     Z-Olu, Medrol, Tessalon Perles x 7 days  Continue to monitor          Medications, treatments, and labs reviewed  Continue medications and treatments as listed in EMR    Scribe Attestation  I, Darlyn Landisibyomaira   attest that this documentation has been prepared under the direction and in the presence of Nadir Black MD    Provider Attestation - Scribe documentation  All medical record entries made by the Scribe were  at my direction and personally dictated by me. I have reviewed the chart and agree that the record accurately reflects my personal performance of the history, physical exam, discussion and plan.   Nadir Black MD

## 2024-02-16 ENCOUNTER — NURSING HOME VISIT (OUTPATIENT)
Dept: POST ACUTE CARE | Facility: EXTERNAL LOCATION | Age: 89
End: 2024-02-16
Payer: MEDICARE

## 2024-02-16 DIAGNOSIS — J06.9 UPPER RESPIRATORY TRACT INFECTION, UNSPECIFIED TYPE: Primary | ICD-10-CM

## 2024-02-16 DIAGNOSIS — J44.9 CHRONIC OBSTRUCTIVE PULMONARY DISEASE, UNSPECIFIED COPD TYPE (MULTI): ICD-10-CM

## 2024-02-16 DIAGNOSIS — I10 PRIMARY HYPERTENSION: ICD-10-CM

## 2024-02-16 PROCEDURE — 99308 SBSQ NF CARE LOW MDM 20: CPT | Performed by: INTERNAL MEDICINE

## 2024-02-16 NOTE — PROGRESS NOTES
PROGRESS NOTE    Subjective   Chief complaint: Mildred Nolen is a 89 y.o. female who is a long term care patient being seen and evaluated for URI.    HPI:  HPI  Patient is seen in follow-up of URI.  Patient continues with cough and congestion.  Patient had completed antibiotic.  Orders placed to restart antibiotic x 3 days.  Patient is on O2 at baseline.  Patient has no fever or chills.  Patient was seen and examined at bedside, appears to be in no apparent distress.  Patient is afebrile at this time.  Patient does denies shortness of breath.    Objective   Vital signs: 145/76, 77    Physical Exam  Constitutional:       General: She is not in acute distress.  Eyes:      Extraocular Movements: Extraocular movements intact.   Cardiovascular:      Rate and Rhythm: Regular rhythm.   Pulmonary:      Effort: Pulmonary effort is normal.      Breath sounds: Normal breath sounds.      Comments: O2  Abdominal:      General: Bowel sounds are normal.      Palpations: Abdomen is soft.   Musculoskeletal:      Cervical back: Neck supple.      Right lower leg: Edema present.      Left lower leg: Edema present.   Neurological:      Mental Status: She is alert.   Psychiatric:         Mood and Affect: Mood normal.         Behavior: Behavior is cooperative.         Assessment/Plan   Problem List Items Addressed This Visit       HTN (hypertension)     Monitor BP   continue with antihypertensive meds          COPD (chronic obstructive pulmonary disease) (CMS/[facility])     Lungs clear.   No wheezing   Denies any SOB          URI (upper respiratory infection) - Primary     Z-Olu, Medrol, Tessalon Perles x 7 days  Restart antibiotic x 3 days  Continue to monitor          Medications, treatments, and labs reviewed  Continue medications and treatments as listed in EMR    Scribe Attestation  MEGHAN, Joseph Landis   attest that this documentation has been prepared under the direction and in the presence of Nadir Black MD    Provider  Attestation - Scribe documentation  All medical record entries made by the Scribe were at my direction and personally dictated by me. I have reviewed the chart and agree that the record accurately reflects my personal performance of the history, physical exam, discussion and plan.   Nadir Black MD

## 2024-02-16 NOTE — LETTER
Patient: Mildred Nolen  : 1930    Encounter Date: 2024    PROGRESS NOTE    Subjective  Chief complaint: Mildred Nolen is a 89 y.o. female who is a long term care patient being seen and evaluated for URI.    HPI:  HPI  Patient is seen in follow-up of URI.  Patient continues with cough and congestion.  Patient had completed antibiotic.  Orders placed to restart antibiotic x 3 days.  Patient is on O2 at baseline.  Patient has no fever or chills.  Patient was seen and examined at bedside, appears to be in no apparent distress.  Patient is afebrile at this time.  Patient does denies shortness of breath.    Objective  Vital signs: 145/76, 77    Physical Exam  Constitutional:       General: She is not in acute distress.  Eyes:      Extraocular Movements: Extraocular movements intact.   Cardiovascular:      Rate and Rhythm: Regular rhythm.   Pulmonary:      Effort: Pulmonary effort is normal.      Breath sounds: Normal breath sounds.      Comments: O2  Abdominal:      General: Bowel sounds are normal.      Palpations: Abdomen is soft.   Musculoskeletal:      Cervical back: Neck supple.      Right lower leg: Edema present.      Left lower leg: Edema present.   Neurological:      Mental Status: She is alert.   Psychiatric:         Mood and Affect: Mood normal.         Behavior: Behavior is cooperative.         Assessment/Plan  Problem List Items Addressed This Visit       HTN (hypertension)     Monitor BP   continue with antihypertensive meds          COPD (chronic obstructive pulmonary disease) (CMS/[facility])     Lungs clear.   No wheezing   Denies any SOB          URI (upper respiratory infection) - Primary     Z-Olu, Medrol, Tessalon Perles x 7 days  Restart antibiotic x 3 days  Continue to monitor          Medications, treatments, and labs reviewed  Continue medications and treatments as listed in EMR    Scribe Attestation  I, Joseph Landis   attest that this documentation has been prepared under the  direction and in the presence of Nadir Black MD    Provider Attestation - Scribe documentation  All medical record entries made by the Scribe were at my direction and personally dictated by me. I have reviewed the chart and agree that the record accurately reflects my personal performance of the history, physical exam, discussion and plan.   Nadir Black MD            Electronically Signed By: Nadir Black MD   2/16/24  3:40 PM

## 2024-02-21 ENCOUNTER — NURSING HOME VISIT (OUTPATIENT)
Dept: POST ACUTE CARE | Facility: EXTERNAL LOCATION | Age: 89
End: 2024-02-21
Payer: MEDICARE

## 2024-02-21 DIAGNOSIS — S81.809A OPEN WOUND OF LOWER EXTREMITY, UNSPECIFIED LATERALITY, INITIAL ENCOUNTER: ICD-10-CM

## 2024-02-21 DIAGNOSIS — I10 PRIMARY HYPERTENSION: ICD-10-CM

## 2024-02-21 DIAGNOSIS — J06.9 UPPER RESPIRATORY TRACT INFECTION, UNSPECIFIED TYPE: Primary | ICD-10-CM

## 2024-02-21 PROCEDURE — 99309 SBSQ NF CARE MODERATE MDM 30: CPT | Performed by: INTERNAL MEDICINE

## 2024-02-21 NOTE — LETTER
Patient: Mildred Nolen  : 1930    Encounter Date: 2024    PROGRESS NOTE    Subjective  Chief complaint: Mildred Nolen is a 89 y.o. female who is a long term care patient being seen and evaluated for URI f/u.    HPI:  HPI  Patient seen in follow-up of URI.  Patient has completed antibiotics.  Patient does continue with cough and congestion.  Patient to continue with Mucinex, Flonase and aerosol treatments.  Patient was also found to have a small wound on the back of the thigh, wound team to follow.  Patient seen and examined at bedside.  Patient is afebrile at this time.    Objective  Vital signs: 111/48, 57    Physical Exam  Constitutional:       General: She is not in acute distress.  Eyes:      Extraocular Movements: Extraocular movements intact.   Cardiovascular:      Rate and Rhythm: Regular rhythm.   Pulmonary:      Effort: Pulmonary effort is normal.      Breath sounds: Normal breath sounds.      Comments: O2  Congested lung sound  Abdominal:      General: Bowel sounds are normal.      Palpations: Abdomen is soft.   Musculoskeletal:      Cervical back: Neck supple.      Right lower leg: Edema present.      Left lower leg: Edema present.   Skin:     Comments: Small wound to back of thigh   Neurological:      Mental Status: She is alert.   Psychiatric:         Mood and Affect: Mood normal.         Behavior: Behavior is cooperative.         Assessment/Plan  Problem List Items Addressed This Visit       HTN (hypertension)     Monitor BP   continue with antihypertensive meds          URI (upper respiratory infection) - Primary     Completed antibiotics  Continue Mucinex, Flonase and aerosol treatments         Wound, open, leg     Back of thigh  Wound team to follow          Medications, treatments, and labs reviewed  Continue medications and treatments as listed in EMR    Scribe Attestation  MEGHAN, Joseph Landis   attest that this documentation has been prepared under the direction and in the  presence of Nadir Black MD    Provider Attestation - Scribe documentation  All medical record entries made by the Scribe were at my direction and personally dictated by me. I have reviewed the chart and agree that the record accurately reflects my personal performance of the history, physical exam, discussion and plan.   Nadir Black MD            Electronically Signed By: Nadir Black MD   2/21/24  4:11 PM

## 2024-02-21 NOTE — PROGRESS NOTES
PROGRESS NOTE    Subjective   Chief complaint: Mildred Nolen is a 89 y.o. female who is a long term care patient being seen and evaluated for URI f/u.    HPI:  HPI  Patient seen in follow-up of URI.  Patient has completed antibiotics.  Patient does continue with cough and congestion.  Patient to continue with Mucinex, Flonase and aerosol treatments.  Patient was also found to have a small wound on the back of the thigh, wound team to follow.  Patient seen and examined at bedside.  Patient is afebrile at this time.    Objective   Vital signs: 111/48, 57    Physical Exam  Constitutional:       General: She is not in acute distress.  Eyes:      Extraocular Movements: Extraocular movements intact.   Cardiovascular:      Rate and Rhythm: Regular rhythm.   Pulmonary:      Effort: Pulmonary effort is normal.      Breath sounds: Normal breath sounds.      Comments: O2  Congested lung sound  Abdominal:      General: Bowel sounds are normal.      Palpations: Abdomen is soft.   Musculoskeletal:      Cervical back: Neck supple.      Right lower leg: Edema present.      Left lower leg: Edema present.   Skin:     Comments: Small wound to back of thigh   Neurological:      Mental Status: She is alert.   Psychiatric:         Mood and Affect: Mood normal.         Behavior: Behavior is cooperative.         Assessment/Plan   Problem List Items Addressed This Visit       HTN (hypertension)     Monitor BP   continue with antihypertensive meds          URI (upper respiratory infection) - Primary     Completed antibiotics  Continue Mucinex, Flonase and aerosol treatments         Wound, open, leg     Back of thigh  Wound team to follow          Medications, treatments, and labs reviewed  Continue medications and treatments as listed in EMR    Scribe Attestation  MEGHAN, Joseph Landis   attest that this documentation has been prepared under the direction and in the presence of Nadir Black MD    Provider Attestation - Joseph  documentation  All medical record entries made by the Scribe were at my direction and personally dictated by me. I have reviewed the chart and agree that the record accurately reflects my personal performance of the history, physical exam, discussion and plan.   Nadir Black MD

## 2024-03-06 ENCOUNTER — NURSING HOME VISIT (OUTPATIENT)
Dept: POST ACUTE CARE | Facility: EXTERNAL LOCATION | Age: 89
End: 2024-03-06
Payer: MEDICARE

## 2024-03-06 DIAGNOSIS — R60.9 EDEMA, UNSPECIFIED TYPE: ICD-10-CM

## 2024-03-06 DIAGNOSIS — I10 PRIMARY HYPERTENSION: ICD-10-CM

## 2024-03-06 DIAGNOSIS — J44.9 CHRONIC OBSTRUCTIVE PULMONARY DISEASE, UNSPECIFIED COPD TYPE (MULTI): Primary | ICD-10-CM

## 2024-03-06 DIAGNOSIS — K21.9 GASTROESOPHAGEAL REFLUX DISEASE WITHOUT ESOPHAGITIS: ICD-10-CM

## 2024-03-06 PROCEDURE — 99309 SBSQ NF CARE MODERATE MDM 30: CPT | Performed by: INTERNAL MEDICINE

## 2024-03-06 NOTE — PROGRESS NOTES
PROGRESS NOTE    Subjective   Chief complaint: Mildred Nolen is a 89 y.o. female who is a long term care patient being seen and evaluated for monthly general medical care and follow-up    HPI:  HPI  Patient presents for general medical care and f/u.  Patient seen and examined at bedside.  No issues per nursing.  Patient reported to have increased edema to lower extremities.  Orders placed to increase Lasix to 80 mg.  Patient to have a BMP x 1 week for follow-up.  GERD controlled.  Denies heartburn, regurgitation, epigastric discomfort, sour taste, and cough.  HTN BP at goal.  Denies chest pain and headache.  COPD stable, denies sob, wheezing, cough.  No acute distress.    Objective   Vital signs: 110/63, 63    Physical Exam  Constitutional:       General: She is not in acute distress.  Eyes:      Extraocular Movements: Extraocular movements intact.   Cardiovascular:      Rate and Rhythm: Regular rhythm.   Pulmonary:      Effort: Pulmonary effort is normal.      Breath sounds: Normal breath sounds.      Comments: O2  Abdominal:      General: Bowel sounds are normal.      Palpations: Abdomen is soft.   Musculoskeletal:      Cervical back: Neck supple.      Right lower leg: Edema present.      Left lower leg: Edema present.   Neurological:      Mental Status: She is alert.   Psychiatric:         Mood and Affect: Mood normal.         Behavior: Behavior is cooperative.         Assessment/Plan   Problem List Items Addressed This Visit       HTN (hypertension)     Monitor BP   continue with antihypertensive meds          Gastroesophageal reflux disease without esophagitis       Continue Zofran for nausea.  Continue PPI  probiotic  monitor         COPD (chronic obstructive pulmonary disease) (CMS/[facility]) - Primary     Lungs clear.   No wheezing   Denies any SOB          Edema     Increase Lasix to 80 mg            Medications, treatments, and labs reviewed  Continue medications and treatments as listed in EMR    Scribe  Attestation  I, Joseph Landis   attest that this documentation has been prepared under the direction and in the presence of Nadir Black MD    Provider Attestation - Scribe documentation  All medical record entries made by the Scribe were at my direction and personally dictated by me. I have reviewed the chart and agree that the record accurately reflects my personal performance of the history, physical exam, discussion and plan.   Nadir Black MD

## 2024-03-06 NOTE — LETTER
Patient: Mildred Nolen  : 1930    Encounter Date: 2024    PROGRESS NOTE    Subjective  Chief complaint: Mildred Nolen is a 89 y.o. female who is a long term care patient being seen and evaluated for monthly general medical care and follow-up    HPI:  HPI  Patient presents for general medical care and f/u.  Patient seen and examined at bedside.  No issues per nursing.  Patient reported to have increased edema to lower extremities.  Orders placed to increase Lasix to 80 mg.  Patient to have a BMP x 1 week for follow-up.  GERD controlled.  Denies heartburn, regurgitation, epigastric discomfort, sour taste, and cough.  HTN BP at goal.  Denies chest pain and headache.  COPD stable, denies sob, wheezing, cough.  No acute distress.    Objective  Vital signs: 110/63, 63    Physical Exam  Constitutional:       General: She is not in acute distress.  Eyes:      Extraocular Movements: Extraocular movements intact.   Cardiovascular:      Rate and Rhythm: Regular rhythm.   Pulmonary:      Effort: Pulmonary effort is normal.      Breath sounds: Normal breath sounds.      Comments: O2  Abdominal:      General: Bowel sounds are normal.      Palpations: Abdomen is soft.   Musculoskeletal:      Cervical back: Neck supple.      Right lower leg: Edema present.      Left lower leg: Edema present.   Neurological:      Mental Status: She is alert.   Psychiatric:         Mood and Affect: Mood normal.         Behavior: Behavior is cooperative.         Assessment/Plan  Problem List Items Addressed This Visit       HTN (hypertension)     Monitor BP   continue with antihypertensive meds          Gastroesophageal reflux disease without esophagitis       Continue Zofran for nausea.  Continue PPI  probiotic  monitor         COPD (chronic obstructive pulmonary disease) (CMS/[facility]) - Primary     Lungs clear.   No wheezing   Denies any SOB          Edema     Increase Lasix to 80 mg            Medications, treatments, and labs  reviewed  Continue medications and treatments as listed in EMR    Scribe Attestation  I, Joseph Landis   attest that this documentation has been prepared under the direction and in the presence of Nadir Black MD    Provider Attestation - Scribe documentation  All medical record entries made by the Scribe were at my direction and personally dictated by me. I have reviewed the chart and agree that the record accurately reflects my personal performance of the history, physical exam, discussion and plan.   Nadir Black MD            Electronically Signed By: Nadir Black MD   3/6/24  4:03 PM

## 2024-03-13 ENCOUNTER — NURSING HOME VISIT (OUTPATIENT)
Dept: POST ACUTE CARE | Facility: EXTERNAL LOCATION | Age: 89
End: 2024-03-13
Payer: MEDICARE

## 2024-03-13 DIAGNOSIS — R60.9 EDEMA, UNSPECIFIED TYPE: Primary | ICD-10-CM

## 2024-03-13 DIAGNOSIS — J44.9 CHRONIC OBSTRUCTIVE PULMONARY DISEASE, UNSPECIFIED COPD TYPE (MULTI): ICD-10-CM

## 2024-03-13 DIAGNOSIS — I10 PRIMARY HYPERTENSION: ICD-10-CM

## 2024-03-13 PROCEDURE — 99308 SBSQ NF CARE LOW MDM 20: CPT | Performed by: INTERNAL MEDICINE

## 2024-03-13 NOTE — LETTER
Patient: Mildred Nolen  : 1930    Encounter Date: 2024    PROGRESS NOTE    Subjective  Chief complaint: Mildred Nolen is a 89 y.o. female who is a long term care patient being seen and evaluated for edema    HPI:  HPI  Patient is seen in follow-up to edema.  Patient was started on on increased dose of Lasix 80 mg daily with repeat lab that resulted as sodium 146 potassium 3.9 and BUN and creatinine of 23 and 1.5.  Stable.  Patient seen and examined at bedside, appears to be in no acute distress.    Objective  Vital signs: 115/58, 63    Physical Exam  Constitutional:       General: She is not in acute distress.  Eyes:      Extraocular Movements: Extraocular movements intact.   Cardiovascular:      Rate and Rhythm: Regular rhythm.   Pulmonary:      Effort: Pulmonary effort is normal.      Breath sounds: Normal breath sounds.      Comments: O2  Abdominal:      General: Bowel sounds are normal.      Palpations: Abdomen is soft.   Musculoskeletal:      Cervical back: Neck supple.      Right lower leg: Edema present.      Left lower leg: Edema present.   Neurological:      Mental Status: She is alert.   Psychiatric:         Mood and Affect: Mood normal.         Behavior: Behavior is cooperative.         Assessment/Plan  Problem List Items Addressed This Visit       HTN (hypertension)     Monitor BP   continue with antihypertensive meds   BP at goal         COPD (chronic obstructive pulmonary disease) (CMS/[facility])     Lungs clear.   No wheezing   Denies any SOB          Edema - Primary     Labs stable, within normal limits  Continue Lasix          Medications, treatments, and labs reviewed  Continue medications and treatments as listed in EMR    Scribe Attestation  I, Joseph Landis   attest that this documentation has been prepared under the direction and in the presence of Nadir Black MD    Provider Attestation - Scribe documentation  All medical record entries made by the Scribe were at my  direction and personally dictated by me. I have reviewed the chart and agree that the record accurately reflects my personal performance of the history, physical exam, discussion and plan.   Nadir Black MD            Electronically Signed By: Nadir Black MD   3/13/24  3:23 PM

## 2024-03-13 NOTE — PROGRESS NOTES
PROGRESS NOTE    Subjective   Chief complaint: Mildred Nolen is a 89 y.o. female who is a long term care patient being seen and evaluated for edema    HPI:  HPI  Patient is seen in follow-up to edema.  Patient was started on on increased dose of Lasix 80 mg daily with repeat lab that resulted as sodium 146 potassium 3.9 and BUN and creatinine of 23 and 1.5.  Stable.  Patient seen and examined at bedside, appears to be in no acute distress.    Objective   Vital signs: 115/58, 63    Physical Exam  Constitutional:       General: She is not in acute distress.  Eyes:      Extraocular Movements: Extraocular movements intact.   Cardiovascular:      Rate and Rhythm: Regular rhythm.   Pulmonary:      Effort: Pulmonary effort is normal.      Breath sounds: Normal breath sounds.      Comments: O2  Abdominal:      General: Bowel sounds are normal.      Palpations: Abdomen is soft.   Musculoskeletal:      Cervical back: Neck supple.      Right lower leg: Edema present.      Left lower leg: Edema present.   Neurological:      Mental Status: She is alert.   Psychiatric:         Mood and Affect: Mood normal.         Behavior: Behavior is cooperative.         Assessment/Plan   Problem List Items Addressed This Visit       HTN (hypertension)     Monitor BP   continue with antihypertensive meds   BP at goal         COPD (chronic obstructive pulmonary disease) (CMS/[facility])     Lungs clear.   No wheezing   Denies any SOB          Edema - Primary     Labs stable, within normal limits  Continue Lasix          Medications, treatments, and labs reviewed  Continue medications and treatments as listed in EMR    Scribe Attestation  Ana CHRISTIANSON Scribe   attest that this documentation has been prepared under the direction and in the presence of Nadir Black MD    Provider Attestation - Scribe documentation  All medical record entries made by the Scribe were at my direction and personally dictated by me. I have reviewed the chart and agree  that the record accurately reflects my personal performance of the history, physical exam, discussion and plan.   Nadir Black MD

## 2024-03-15 ENCOUNTER — NURSING HOME VISIT (OUTPATIENT)
Dept: POST ACUTE CARE | Facility: EXTERNAL LOCATION | Age: 89
End: 2024-03-15
Payer: MEDICARE

## 2024-03-15 DIAGNOSIS — I10 PRIMARY HYPERTENSION: ICD-10-CM

## 2024-03-15 DIAGNOSIS — E87.0 HYPERNATREMIA: Primary | ICD-10-CM

## 2024-03-15 DIAGNOSIS — J44.9 CHRONIC OBSTRUCTIVE PULMONARY DISEASE, UNSPECIFIED COPD TYPE (MULTI): ICD-10-CM

## 2024-03-15 PROCEDURE — 99308 SBSQ NF CARE LOW MDM 20: CPT | Performed by: INTERNAL MEDICINE

## 2024-03-15 NOTE — PROGRESS NOTES
PROGRESS NOTE    Subjective   Chief complaint: Mildred Nolen is a 89 y.o. female who is a long term care patient being seen and evaluated for  hypernatremia    HPI:  HPI  Patient is seen for hypernatremia that resulted on recent labs. Continue monitoring labs. Encourage p.o. fluids. Patient is seen and examined at the bedside, appears to be in no acute distress.    Objective   Vital signs:  150/62, 67    Physical Exam  Constitutional:       General: She is not in acute distress.  Eyes:      Extraocular Movements: Extraocular movements intact.   Cardiovascular:      Rate and Rhythm: Regular rhythm.   Pulmonary:      Effort: Pulmonary effort is normal.      Breath sounds: Normal breath sounds.      Comments: O2  Abdominal:      General: Bowel sounds are normal.      Palpations: Abdomen is soft.   Musculoskeletal:      Cervical back: Neck supple.      Right lower leg: Edema present.      Left lower leg: Edema present.   Neurological:      Mental Status: She is alert.   Psychiatric:         Mood and Affect: Mood normal.         Behavior: Behavior is cooperative.         Assessment/Plan   Problem List Items Addressed This Visit       HTN (hypertension)     Monitor BP   continue with antihypertensive meds   BP at goal         COPD (chronic obstructive pulmonary disease) (CMS/[facility])     Lungs clear.   No wheezing   Denies any SOB          Hypernatremia - Primary     Encourage po fluid intake  Monitor labs          Medications, treatments, and labs reviewed  Continue medications and treatments as listed in EMR    Scribe Attestation  I, Joseph Landis   attest that this documentation has been prepared under the direction and in the presence of Nadir Black MD    Provider Attestation - Scribe documentation  All medical record entries made by the Scribe were at my direction and personally dictated by me. I have reviewed the chart and agree that the record accurately reflects my personal performance of the history,  physical exam, discussion and plan.   Nadir Black MD

## 2024-03-20 ENCOUNTER — NURSING HOME VISIT (OUTPATIENT)
Dept: POST ACUTE CARE | Facility: EXTERNAL LOCATION | Age: 89
End: 2024-03-20
Payer: MEDICARE

## 2024-03-20 DIAGNOSIS — L03.115 BILATERAL CELLULITIS OF LOWER LEG: Primary | ICD-10-CM

## 2024-03-20 DIAGNOSIS — J44.9 CHRONIC OBSTRUCTIVE PULMONARY DISEASE, UNSPECIFIED COPD TYPE (MULTI): ICD-10-CM

## 2024-03-20 DIAGNOSIS — I10 PRIMARY HYPERTENSION: ICD-10-CM

## 2024-03-20 DIAGNOSIS — L03.116 BILATERAL CELLULITIS OF LOWER LEG: Primary | ICD-10-CM

## 2024-03-20 PROCEDURE — 99309 SBSQ NF CARE MODERATE MDM 30: CPT | Performed by: INTERNAL MEDICINE

## 2024-03-20 NOTE — LETTER
Patient: Mildred Nolen  : 1930    Encounter Date: 2024    PROGRESS NOTE    Subjective  Chief complaint: Mildred Nolen is a 89 y.o. female who is a long term care patient being seen and evaluated for  venous stasis.    HPI:  HPI  Patient is seen in follow-up to edema, lower extremities with increased redness with a diagnosis of venous stasis and cellulitis.  Patient was started on Lasix 80 mg and doxycycline x 10 days.  Patient is tolerating antibiotic without adverse effects.  Patient seen and examined at bedside, appears to be in no acute distress.  Patient remains afebrile.    Objective  Vital signs: 134/65, 61    Physical Exam  Constitutional:       General: She is not in acute distress.  Eyes:      Extraocular Movements: Extraocular movements intact.   Cardiovascular:      Rate and Rhythm: Regular rhythm.   Pulmonary:      Effort: Pulmonary effort is normal.      Breath sounds: Normal breath sounds.      Comments: O2  Abdominal:      General: Bowel sounds are normal.      Palpations: Abdomen is soft.   Musculoskeletal:      Cervical back: Neck supple.      Right lower leg: Edema present.      Left lower leg: Edema present.   Neurological:      Mental Status: She is alert.   Psychiatric:         Mood and Affect: Mood normal.         Behavior: Behavior is cooperative.         Assessment/Plan  Problem List Items Addressed This Visit       HTN (hypertension)     Monitor BP   continue with antihypertensive meds            COPD (chronic obstructive pulmonary disease) (CMS/[facility])     Lungs clear.   No wheezing   Denies any SOB          Bilateral cellulitis of lower leg - Primary     Venous stasis  Lasix 80 mg and doxycycline x 10 days  Continue to monitor          Medications, treatments, and labs reviewed  Continue medications and treatments as listed in EMR    Scribe Attestation  I, Joseph Landis   attest that this documentation has been prepared under the direction and in the presence of Nadir  CRYSTAL Black MD    Provider Attestation - Scribe documentation  All medical record entries made by the Scribe were at my direction and personally dictated by me. I have reviewed the chart and agree that the record accurately reflects my personal performance of the history, physical exam, discussion and plan.   Nadir Black MD            Electronically Signed By: Nadir Black MD   3/20/24  4:16 PM

## 2024-03-20 NOTE — PROGRESS NOTES
PROGRESS NOTE    Subjective   Chief complaint: Mildred Nolen is a 89 y.o. female who is a long term care patient being seen and evaluated for  venous stasis.    HPI:  HPI  Patient is seen in follow-up to edema, lower extremities with increased redness with a diagnosis of venous stasis and cellulitis.  Patient was started on Lasix 80 mg and doxycycline x 10 days.  Patient is tolerating antibiotic without adverse effects.  Patient seen and examined at bedside, appears to be in no acute distress.  Patient remains afebrile.    Objective   Vital signs: 134/65, 61    Physical Exam  Constitutional:       General: She is not in acute distress.  Eyes:      Extraocular Movements: Extraocular movements intact.   Cardiovascular:      Rate and Rhythm: Regular rhythm.   Pulmonary:      Effort: Pulmonary effort is normal.      Breath sounds: Normal breath sounds.      Comments: O2  Abdominal:      General: Bowel sounds are normal.      Palpations: Abdomen is soft.   Musculoskeletal:      Cervical back: Neck supple.      Right lower leg: Edema present.      Left lower leg: Edema present.   Neurological:      Mental Status: She is alert.   Psychiatric:         Mood and Affect: Mood normal.         Behavior: Behavior is cooperative.         Assessment/Plan   Problem List Items Addressed This Visit       HTN (hypertension)     Monitor BP   continue with antihypertensive meds            COPD (chronic obstructive pulmonary disease) (CMS/[facility])     Lungs clear.   No wheezing   Denies any SOB          Bilateral cellulitis of lower leg - Primary     Venous stasis  Lasix 80 mg and doxycycline x 10 days  Continue to monitor          Medications, treatments, and labs reviewed  Continue medications and treatments as listed in EMR    Scribe Attestation  I, Darlyn Landisibyomaira   attest that this documentation has been prepared under the direction and in the presence of Nadir Black MD    Provider Attestation - Scribe documentation  All  medical record entries made by the Scribe were at my direction and personally dictated by me. I have reviewed the chart and agree that the record accurately reflects my personal performance of the history, physical exam, discussion and plan.   Nadir Black MD

## 2024-03-21 ENCOUNTER — NURSING HOME VISIT (OUTPATIENT)
Dept: POST ACUTE CARE | Facility: EXTERNAL LOCATION | Age: 89
End: 2024-03-21
Payer: MEDICARE

## 2024-03-21 DIAGNOSIS — L03.116 BILATERAL CELLULITIS OF LOWER LEG: Primary | ICD-10-CM

## 2024-03-21 DIAGNOSIS — I10 PRIMARY HYPERTENSION: ICD-10-CM

## 2024-03-21 DIAGNOSIS — L03.115 BILATERAL CELLULITIS OF LOWER LEG: Primary | ICD-10-CM

## 2024-03-21 PROCEDURE — 99308 SBSQ NF CARE LOW MDM 20: CPT | Performed by: NURSE PRACTITIONER

## 2024-03-21 NOTE — LETTER
Patient: Mildred Nolen  : 1930    Encounter Date: 2024    PROGRESS NOTE    Subjective  Chief complaint: Mildred Nolen is a 89 y.o. female who is a long term care patient being seen and evaluated for resolving cellulitis     HPI: both legs are wrapped with ace bandages. She reports that her legs are feeling better with antibiotic. Requested probiotic. Is in wheelchaiir with legs dependent. Nursing reports that she remains resistant with elevation. Discussed with therapy to assess for power chair that would allow elevation of her legs.   HPI      Objective  Vital signs: 127/66-72-18-97.4     Physical Exam  Vitals and nursing note reviewed.   Constitutional:       General: She is not in acute distress.  Eyes:      Extraocular Movements: Extraocular movements intact.   Pulmonary:      Effort: Pulmonary effort is normal.   Musculoskeletal:      Cervical back: Neck supple.      Comments: Self propelling in wheelchair   Remains with extensive lymphedema   Legs wrapped with ace wraps      Neurological:      Mental Status: She is alert.   Psychiatric:         Mood and Affect: Mood normal.         Behavior: Behavior is cooperative.         Assessment/Plan  Problem List Items Addressed This Visit    None    Medications, treatments, and labs reviewed  Continue medications and treatments as listed in EMR    ELIZABETH Howard      Electronically Signed By: ELIZABETH Howard   3/21/24  7:21 PM

## 2024-03-21 NOTE — ASSESSMENT & PLAN NOTE
Venous stasis  Lasix 80 mg and doxycycline x 10 days  Reports that he legs are feeling better. Legs are wrapped with ace bandages- not visualized   Therapy to assess for power chair with ability to elevate legs   Started on probiotic

## 2024-03-22 ENCOUNTER — NURSING HOME VISIT (OUTPATIENT)
Dept: POST ACUTE CARE | Facility: EXTERNAL LOCATION | Age: 89
End: 2024-03-22
Payer: MEDICARE

## 2024-03-22 DIAGNOSIS — L03.116 BILATERAL CELLULITIS OF LOWER LEG: Primary | ICD-10-CM

## 2024-03-22 DIAGNOSIS — I10 PRIMARY HYPERTENSION: ICD-10-CM

## 2024-03-22 DIAGNOSIS — J44.9 CHRONIC OBSTRUCTIVE PULMONARY DISEASE, UNSPECIFIED COPD TYPE (MULTI): ICD-10-CM

## 2024-03-22 DIAGNOSIS — L03.115 BILATERAL CELLULITIS OF LOWER LEG: Primary | ICD-10-CM

## 2024-03-22 PROCEDURE — 99308 SBSQ NF CARE LOW MDM 20: CPT | Performed by: INTERNAL MEDICINE

## 2024-03-22 NOTE — LETTER
Patient: Mildred Nolen  : 1930    Encounter Date: 2024    PROGRESS NOTE    Subjective  Chief complaint: Mildred Nolen is a 89 y.o. female who is a long term care patient being seen and evaluated for cellulitis.     HPI:  HPI  Patient is seen in follow-up for cellulitis.  Patient was started on doxycycline and Lasix with improvement.  Patient is tolerating antibiotic without adverse effects.  Patient seen and examined at bedside, appears to be in no acute distress.  Denies chest pain or shortness of breath.  Denies nausea or vomiting.    Objective  Vital signs: 124/65, 60,     Physical Exam  Constitutional:       General: She is not in acute distress.  Eyes:      Extraocular Movements: Extraocular movements intact.   Cardiovascular:      Rate and Rhythm: Regular rhythm.   Pulmonary:      Effort: Pulmonary effort is normal.      Breath sounds: Normal breath sounds.      Comments: O2  Abdominal:      General: Bowel sounds are normal.      Palpations: Abdomen is soft.   Musculoskeletal:      Cervical back: Neck supple.      Right lower leg: Edema present.      Left lower leg: Edema present.      Comments: Ace wrap's to bilateral lower extremities   Neurological:      Mental Status: She is alert.   Psychiatric:         Mood and Affect: Mood normal.         Behavior: Behavior is cooperative.         Assessment/Plan  Problem List Items Addressed This Visit       HTN (hypertension)     Monitor BP   continue with antihypertensive meds            COPD (chronic obstructive pulmonary disease) (CMS/[facility])     Lungs clear.   No wheezing   Denies any SOB          Bilateral cellulitis of lower leg - Primary     Venous stasis  Lasix 80 mg and doxycycline x 10 days  Continue to monitor  ACE wrap's          Medications, treatments, and labs reviewed  Continue medications and treatments as listed in EMR    Scribe Attestation  I, Joseph Landis   attest that this documentation has been prepared under the direction and  in the presence of Nadir Black MD    Provider Attestation - Scribe documentation  All medical record entries made by the Scribe were at my direction and personally dictated by me. I have reviewed the chart and agree that the record accurately reflects my personal performance of the history, physical exam, discussion and plan.   Nadir Black MD            Electronically Signed By: Nadir Black MD   3/27/24  6:52 PM

## 2024-03-24 NOTE — PROGRESS NOTES
PROGRESS NOTE    Subjective   Chief complaint: Mildred Nolen is a 89 y.o. female who is a long term care patient being seen and evaluated for cellulitis.     HPI:  HPI  Patient is seen in follow-up for cellulitis.  Patient was started on doxycycline and Lasix with improvement.  Patient is tolerating antibiotic without adverse effects.  Patient seen and examined at bedside, appears to be in no acute distress.  Denies chest pain or shortness of breath.  Denies nausea or vomiting.    Objective   Vital signs: 124/65, 60,     Physical Exam  Constitutional:       General: She is not in acute distress.  Eyes:      Extraocular Movements: Extraocular movements intact.   Cardiovascular:      Rate and Rhythm: Regular rhythm.   Pulmonary:      Effort: Pulmonary effort is normal.      Breath sounds: Normal breath sounds.      Comments: O2  Abdominal:      General: Bowel sounds are normal.      Palpations: Abdomen is soft.   Musculoskeletal:      Cervical back: Neck supple.      Right lower leg: Edema present.      Left lower leg: Edema present.      Comments: Ace wrap's to bilateral lower extremities   Neurological:      Mental Status: She is alert.   Psychiatric:         Mood and Affect: Mood normal.         Behavior: Behavior is cooperative.         Assessment/Plan   Problem List Items Addressed This Visit       HTN (hypertension)     Monitor BP   continue with antihypertensive meds            COPD (chronic obstructive pulmonary disease) (CMS/[facility])     Lungs clear.   No wheezing   Denies any SOB          Bilateral cellulitis of lower leg - Primary     Venous stasis  Lasix 80 mg and doxycycline x 10 days  Continue to monitor  ACE wrap's          Medications, treatments, and labs reviewed  Continue medications and treatments as listed in EMR    Scribe Attestation  I, Ana Grimm, Darlynibe   attest that this documentation has been prepared under the direction and in the presence of Nadir Black MD    Provider Attestation -  Scribe documentation  All medical record entries made by the Scribe were at my direction and personally dictated by me. I have reviewed the chart and agree that the record accurately reflects my personal performance of the history, physical exam, discussion and plan.   Nadir Black MD

## 2024-03-27 ENCOUNTER — NURSING HOME VISIT (OUTPATIENT)
Dept: POST ACUTE CARE | Facility: EXTERNAL LOCATION | Age: 89
End: 2024-03-27
Payer: MEDICARE

## 2024-03-27 DIAGNOSIS — L03.116 BILATERAL CELLULITIS OF LOWER LEG: Primary | ICD-10-CM

## 2024-03-27 DIAGNOSIS — K21.9 GASTROESOPHAGEAL REFLUX DISEASE WITHOUT ESOPHAGITIS: ICD-10-CM

## 2024-03-27 DIAGNOSIS — S91.302D OPEN WOUND OF LEFT HEEL, SUBSEQUENT ENCOUNTER: ICD-10-CM

## 2024-03-27 DIAGNOSIS — I10 PRIMARY HYPERTENSION: ICD-10-CM

## 2024-03-27 DIAGNOSIS — L03.115 BILATERAL CELLULITIS OF LOWER LEG: Primary | ICD-10-CM

## 2024-03-27 PROBLEM — S91.302A OPEN WOUND OF LEFT HEEL: Status: ACTIVE | Noted: 2024-03-27

## 2024-03-27 PROCEDURE — 99308 SBSQ NF CARE LOW MDM 20: CPT | Performed by: INTERNAL MEDICINE

## 2024-03-27 NOTE — PROGRESS NOTES
PROGRESS NOTE    Subjective   Chief complaint: Mildred Nolen is a 89 y.o. female who is a long term care patient being seen and evaluated for cellulitis.    HPI:  HPI  Patient is seen in follow-up to cellulitis\edema.  Patient does continue on antibiotics, tolerating without adverse effects.  Patient also noted with a small open area to left heel.  Wound team is following.  Patient's cough has improved.  No shortness of breath.    Objective   Vital signs: 125/69, 79    Physical Exam  Constitutional:       General: She is not in acute distress.  Eyes:      Extraocular Movements: Extraocular movements intact.   Cardiovascular:      Rate and Rhythm: Regular rhythm.   Pulmonary:      Effort: Pulmonary effort is normal.      Breath sounds: Normal breath sounds.      Comments: O2  Abdominal:      General: Bowel sounds are normal.      Palpations: Abdomen is soft.   Musculoskeletal:      Cervical back: Neck supple.      Right lower leg: Edema present.      Left lower leg: Edema present.      Comments: Ace wrap's to bilateral lower extremities   Neurological:      Mental Status: She is alert.   Psychiatric:         Mood and Affect: Mood normal.         Behavior: Behavior is cooperative.         Assessment/Plan   Problem List Items Addressed This Visit       HTN (hypertension)     Monitor BP   continue with antihypertensive meds            Gastroesophageal reflux disease without esophagitis       Continue Zofran for nausea.  Continue PPI  probiotic  monitor         Bilateral cellulitis of lower leg - Primary     Venous stasis  Lasix   doxycycline until completed 3/29/2024  Continue to monitor  ACE wrap's         Open wound of left heel     Treatment and interventions per EMR  Wound doctor has been consulted and will be responsible for the evaluation and comprehensive management of of the wound including appropriate control of complicating factors such as unrelieved pressure, infection, vascular and/or uncontrolled metabolic  derangement, and/or nutritional deficiency in addition to appropriate debridement          Medications, treatments, and labs reviewed  Continue medications and treatments as listed in EMR    Scribe Attestation  I, Joseph Landis   attest that this documentation has been prepared under the direction and in the presence of Nadir Black MD    Provider Attestation - Scribe documentation  All medical record entries made by the Scribe were at my direction and personally dictated by me. I have reviewed the chart and agree that the record accurately reflects my personal performance of the history, physical exam, discussion and plan.   Nadir Black MD

## 2024-03-27 NOTE — LETTER
Patient: Mildred Nolen  : 1930    Encounter Date: 2024    PROGRESS NOTE    Subjective  Chief complaint: Mildred Nolen is a 89 y.o. female who is a long term care patient being seen and evaluated for cellulitis.    HPI:  HPI  Patient is seen in follow-up to cellulitis\edema.  Patient does continue on antibiotics, tolerating without adverse effects.  Patient also noted with a small open area to left heel.  Wound team is following.  Patient's cough has improved.  No shortness of breath.    Objective  Vital signs: 125/69, 79    Physical Exam  Constitutional:       General: She is not in acute distress.  Eyes:      Extraocular Movements: Extraocular movements intact.   Cardiovascular:      Rate and Rhythm: Regular rhythm.   Pulmonary:      Effort: Pulmonary effort is normal.      Breath sounds: Normal breath sounds.      Comments: O2  Abdominal:      General: Bowel sounds are normal.      Palpations: Abdomen is soft.   Musculoskeletal:      Cervical back: Neck supple.      Right lower leg: Edema present.      Left lower leg: Edema present.      Comments: Ace wrap's to bilateral lower extremities   Neurological:      Mental Status: She is alert.   Psychiatric:         Mood and Affect: Mood normal.         Behavior: Behavior is cooperative.         Assessment/Plan  Problem List Items Addressed This Visit       HTN (hypertension)     Monitor BP   continue with antihypertensive meds            Gastroesophageal reflux disease without esophagitis       Continue Zofran for nausea.  Continue PPI  probiotic  monitor         Bilateral cellulitis of lower leg - Primary     Venous stasis  Lasix   doxycycline until completed 3/29/2024  Continue to monitor  ACE wrap's         Open wound of left heel     Treatment and interventions per EMR  Wound doctor has been consulted and will be responsible for the evaluation and comprehensive management of of the wound including appropriate control of complicating factors such as  unrelieved pressure, infection, vascular and/or uncontrolled metabolic derangement, and/or nutritional deficiency in addition to appropriate debridement          Medications, treatments, and labs reviewed  Continue medications and treatments as listed in EMR    Scribe Attestation  I, Joseph Landis   attest that this documentation has been prepared under the direction and in the presence of Nadir Black MD    Provider Attestation - Scribe documentation  All medical record entries made by the Scribe were at my direction and personally dictated by me. I have reviewed the chart and agree that the record accurately reflects my personal performance of the history, physical exam, discussion and plan.   Nadir Black MD            Electronically Signed By: Nadir Black MD   3/27/24  6:41 PM

## 2024-04-03 ENCOUNTER — NURSING HOME VISIT (OUTPATIENT)
Dept: POST ACUTE CARE | Facility: EXTERNAL LOCATION | Age: 89
End: 2024-04-03
Payer: MEDICARE

## 2024-04-03 DIAGNOSIS — L03.116 BILATERAL CELLULITIS OF LOWER LEG: Primary | ICD-10-CM

## 2024-04-03 DIAGNOSIS — J44.9 CHRONIC OBSTRUCTIVE PULMONARY DISEASE, UNSPECIFIED COPD TYPE (MULTI): ICD-10-CM

## 2024-04-03 DIAGNOSIS — L03.115 BILATERAL CELLULITIS OF LOWER LEG: Primary | ICD-10-CM

## 2024-04-03 DIAGNOSIS — I10 PRIMARY HYPERTENSION: ICD-10-CM

## 2024-04-03 PROCEDURE — 99308 SBSQ NF CARE LOW MDM 20: CPT | Performed by: INTERNAL MEDICINE

## 2024-04-03 NOTE — LETTER
Patient: Mildred Nolen  : 1930    Encounter Date: 2024    PROGRESS NOTE    Subjective  Chief complaint: Mildred Nolen is a 89 y.o. female who is a long term care patient being seen and evaluated for follow-up.    HPI:  HPI  Patient is seen in follow-up of cellulitis.  Patient does continue on antibiotic, tolerating well.  Patient's lower extremities remain edematous with erythema, tender to palpation.  Orders are placed to continue Lasix, elevate legs as able Tubigrip's and wraps.  Patient seen and examined at bedside, appears to be in no acute distress.  Patient is afebrile.    Objective  Vital signs: 117/72, 63    Physical Exam  Constitutional:       General: She is not in acute distress.  Eyes:      Extraocular Movements: Extraocular movements intact.   Cardiovascular:      Rate and Rhythm: Regular rhythm.   Pulmonary:      Effort: Pulmonary effort is normal.      Breath sounds: Normal breath sounds.      Comments: O2  Abdominal:      General: Bowel sounds are normal.      Palpations: Abdomen is soft.   Musculoskeletal:      Cervical back: Neck supple.      Right lower leg: Edema present.      Left lower leg: Edema present.      Comments: Ace wrap's to bilateral lower extremities  B/L red and tender to palpation   Neurological:      Mental Status: She is alert.   Psychiatric:         Mood and Affect: Mood normal.         Behavior: Behavior is cooperative.         Assessment/Plan  Problem List Items Addressed This Visit       HTN (hypertension)     Monitor BP   continue with antihypertensive meds            COPD (chronic obstructive pulmonary disease) (CMS/[facility])     Lungs clear.   No wheezing   Denies any SOB          Bilateral cellulitis of lower leg - Primary     Venous stasis  Lasix   doxycycline until completed 4/10/2024  Continue to monitor  ACE wrap's          Medications, treatments, and labs reviewed  Continue medications and treatments as listed in EMR    Scribe Attestation  I, Ana Grimm,  Scribe   attest that this documentation has been prepared under the direction and in the presence of Nadir Black MD    Provider Attestation - Scribe documentation  All medical record entries made by the Scribe were at my direction and personally dictated by me. I have reviewed the chart and agree that the record accurately reflects my personal performance of the history, physical exam, discussion and plan.   Nadir Black MD            Electronically Signed By: Nadir Black MD   4/3/24  4:14 PM

## 2024-04-03 NOTE — PROGRESS NOTES
PROGRESS NOTE    Subjective   Chief complaint: Mildred Nolen is a 89 y.o. female who is a long term care patient being seen and evaluated for follow-up.    HPI:  HPI  Patient is seen in follow-up of cellulitis.  Patient does continue on antibiotic, tolerating well.  Patient's lower extremities remain edematous with erythema, tender to palpation.  Orders are placed to continue Lasix, elevate legs as able Tubigrip's and wraps.  Patient seen and examined at bedside, appears to be in no acute distress.  Patient is afebrile.    Objective   Vital signs: 117/72, 63    Physical Exam  Constitutional:       General: She is not in acute distress.  Eyes:      Extraocular Movements: Extraocular movements intact.   Cardiovascular:      Rate and Rhythm: Regular rhythm.   Pulmonary:      Effort: Pulmonary effort is normal.      Breath sounds: Normal breath sounds.      Comments: O2  Abdominal:      General: Bowel sounds are normal.      Palpations: Abdomen is soft.   Musculoskeletal:      Cervical back: Neck supple.      Right lower leg: Edema present.      Left lower leg: Edema present.      Comments: Ace wrap's to bilateral lower extremities  B/L red and tender to palpation   Neurological:      Mental Status: She is alert.   Psychiatric:         Mood and Affect: Mood normal.         Behavior: Behavior is cooperative.         Assessment/Plan   Problem List Items Addressed This Visit       HTN (hypertension)     Monitor BP   continue with antihypertensive meds            COPD (chronic obstructive pulmonary disease) (CMS/[facility])     Lungs clear.   No wheezing   Denies any SOB          Bilateral cellulitis of lower leg - Primary     Venous stasis  Lasix   doxycycline until completed 4/10/2024  Continue to monitor  ACE wrap's          Medications, treatments, and labs reviewed  Continue medications and treatments as listed in EMR    Scribe Attestation  I, Joseph Landis   attest that this documentation has been prepared under the  direction and in the presence of Nadir Black MD    Provider Attestation - Scribe documentation  All medical record entries made by the Scribe were at my direction and personally dictated by me. I have reviewed the chart and agree that the record accurately reflects my personal performance of the history, physical exam, discussion and plan.   Nadir Black MD

## 2024-04-05 ENCOUNTER — NURSING HOME VISIT (OUTPATIENT)
Dept: POST ACUTE CARE | Facility: EXTERNAL LOCATION | Age: 89
End: 2024-04-05
Payer: MEDICARE

## 2024-04-05 DIAGNOSIS — J44.9 CHRONIC OBSTRUCTIVE PULMONARY DISEASE, UNSPECIFIED COPD TYPE (MULTI): Primary | ICD-10-CM

## 2024-04-05 DIAGNOSIS — I10 PRIMARY HYPERTENSION: ICD-10-CM

## 2024-04-05 DIAGNOSIS — K21.9 GASTROESOPHAGEAL REFLUX DISEASE WITHOUT ESOPHAGITIS: ICD-10-CM

## 2024-04-05 PROCEDURE — 99309 SBSQ NF CARE MODERATE MDM 30: CPT | Performed by: INTERNAL MEDICINE

## 2024-04-05 NOTE — LETTER
Patient: Mildred Nolen  : 1930    Encounter Date: 2024    PROGRESS NOTE    Subjective  Chief complaint: Mildred Nolen is a 89 y.o. female who is a long term care patient being seen and evaluated for monthly general medical care and follow-up    HPI:  HPI  Patient presents for general medical care and f/u.  Patient seen and examined at bedside.  No issues per nursing.  Patient has no acute complaints.  COPD stable, denies sob, wheezing, cough.  GERD controlled.  Denies heartburn, regurgitation, epigastric discomfort, sour taste, and cough.  HTN BP at goal.  Denies chest pain and headache.  Patient does require assistance with ADLs.  No acute distress.    Objective  Vital signs: 137/67, 64    Physical Exam  Constitutional:       General: She is not in acute distress.  Eyes:      Extraocular Movements: Extraocular movements intact.   Cardiovascular:      Rate and Rhythm: Regular rhythm.   Pulmonary:      Effort: Pulmonary effort is normal.      Breath sounds: Normal breath sounds.      Comments: O2  Abdominal:      General: Bowel sounds are normal.      Palpations: Abdomen is soft.   Musculoskeletal:      Cervical back: Neck supple.      Right lower leg: Edema present.      Left lower leg: Edema present.      Comments: Ace wrap's to bilateral lower extremities   Neurological:      Mental Status: She is alert.   Psychiatric:         Mood and Affect: Mood normal.         Behavior: Behavior is cooperative.         Assessment/Plan  Problem List Items Addressed This Visit       HTN (hypertension)     Monitor BP   continue with antihypertensive meds            Gastroesophageal reflux disease without esophagitis       Continue Zofran for nausea.  Continue PPI  probiotic  monitor         COPD (chronic obstructive pulmonary disease) (CMS/[facility]) - Primary     Lungs clear.   No wheezing   Denies any SOB           Medications, treatments, and labs reviewed  Continue medications and treatments as listed in EMR    Scribe  Attestation  I, Joseph Landis   attest that this documentation has been prepared under the direction and in the presence of Nadir Black MD    Provider Attestation - Scribe documentation  All medical record entries made by the Scribe were at my direction and personally dictated by me. I have reviewed the chart and agree that the record accurately reflects my personal performance of the history, physical exam, discussion and plan.   Nadir Black MD            Electronically Signed By: Nadir Black MD   4/5/24  3:28 PM

## 2024-04-05 NOTE — PROGRESS NOTES
PROGRESS NOTE    Subjective   Chief complaint: Mildred Nolen is a 89 y.o. female who is a long term care patient being seen and evaluated for monthly general medical care and follow-up    HPI:  HPI  Patient presents for general medical care and f/u.  Patient seen and examined at bedside.  No issues per nursing.  Patient has no acute complaints.  COPD stable, denies sob, wheezing, cough.  GERD controlled.  Denies heartburn, regurgitation, epigastric discomfort, sour taste, and cough.  HTN BP at goal.  Denies chest pain and headache.  Patient does require assistance with ADLs.  No acute distress.    Objective   Vital signs: 137/67, 64    Physical Exam  Constitutional:       General: She is not in acute distress.  Eyes:      Extraocular Movements: Extraocular movements intact.   Cardiovascular:      Rate and Rhythm: Regular rhythm.   Pulmonary:      Effort: Pulmonary effort is normal.      Breath sounds: Normal breath sounds.      Comments: O2  Abdominal:      General: Bowel sounds are normal.      Palpations: Abdomen is soft.   Musculoskeletal:      Cervical back: Neck supple.      Right lower leg: Edema present.      Left lower leg: Edema present.      Comments: Ace wrap's to bilateral lower extremities   Neurological:      Mental Status: She is alert.   Psychiatric:         Mood and Affect: Mood normal.         Behavior: Behavior is cooperative.         Assessment/Plan   Problem List Items Addressed This Visit       HTN (hypertension)     Monitor BP   continue with antihypertensive meds            Gastroesophageal reflux disease without esophagitis       Continue Zofran for nausea.  Continue PPI  probiotic  monitor         COPD (chronic obstructive pulmonary disease) (CMS/[facility]) - Primary     Lungs clear.   No wheezing   Denies any SOB           Medications, treatments, and labs reviewed  Continue medications and treatments as listed in EMR    Scribe Attestation  I, Joseph Landis   attest that this  documentation has been prepared under the direction and in the presence of Nadir Black MD    Provider Attestation - Scribe documentation  All medical record entries made by the Scribe were at my direction and personally dictated by me. I have reviewed the chart and agree that the record accurately reflects my personal performance of the history, physical exam, discussion and plan.   Nadir Black MD

## 2024-04-09 ENCOUNTER — NURSING HOME VISIT (OUTPATIENT)
Dept: POST ACUTE CARE | Facility: EXTERNAL LOCATION | Age: 89
End: 2024-04-09
Payer: MEDICARE

## 2024-04-09 DIAGNOSIS — L03.116 BILATERAL CELLULITIS OF LOWER LEG: ICD-10-CM

## 2024-04-09 DIAGNOSIS — R39.9 SYMPTOMS OF URINARY TRACT INFECTION: Primary | ICD-10-CM

## 2024-04-09 DIAGNOSIS — L03.115 BILATERAL CELLULITIS OF LOWER LEG: ICD-10-CM

## 2024-04-09 DIAGNOSIS — J44.9 CHRONIC OBSTRUCTIVE PULMONARY DISEASE, UNSPECIFIED COPD TYPE (MULTI): ICD-10-CM

## 2024-04-09 PROCEDURE — 99308 SBSQ NF CARE LOW MDM 20: CPT | Performed by: NURSE PRACTITIONER

## 2024-04-09 NOTE — ASSESSMENT & PLAN NOTE
Venous stasis  Lasix   Remains on antibiotics   doxycycline until completed 4/10/2024  Continue to monitor  ACE wrap's

## 2024-04-09 NOTE — ASSESSMENT & PLAN NOTE
Had reported burning   U/A - abnormal  waiting for urine culture  Denies any dysuria, burning or temperature   Encourage fluids

## 2024-04-09 NOTE — LETTER
Patient: Mildred Nolen  : 1930    Encounter Date: 2024    PROGRESS NOTE    Subjective  Chief complaint: Mildred Nolen is a 89 y.o. female who is a long term care patient being seen and evaluated for symptoms of potential UTI.     HPI: Reviewed urinalysis, abnormal. There is bacteria evident. Waiting for urine culture.   She is pleasant and talkative. Currently denies any dysuria or burning sensation. Remains afebrile. Nursing reported that she had reported burning sensation over the weekend. Encourage fluids   HPI      Objective  Vital signs: 127/67-72-18-97.4     Physical Exam  Vitals and nursing note reviewed.   Constitutional:       General: She is not in acute distress.     Appearance: She is obese.   Eyes:      Extraocular Movements: Extraocular movements intact.   Cardiovascular:      Rate and Rhythm: Normal rate and regular rhythm.      Comments: Lower legs wrapped with ace wraps   Has persistent chronic edema   Pulmonary:      Effort: Pulmonary effort is normal.      Breath sounds: Normal breath sounds.   Abdominal:      General: Bowel sounds are normal.      Palpations: Abdomen is soft.      Comments: Obese   No tenderness to palpation   Bladder not distended   Musculoskeletal:      Cervical back: Neck supple.   Neurological:      Mental Status: She is alert.   Psychiatric:         Mood and Affect: Mood normal.         Behavior: Behavior is cooperative.         Assessment/Plan  Problem List Items Addressed This Visit       COPD (chronic obstructive pulmonary disease) (CMS/HCC)     Lungs clear.   No wheezing   Denies any SOB          Bilateral cellulitis of lower leg     Venous stasis  Lasix   Remains on antibiotics   doxycycline until completed 4/10/2024  Continue to monitor  ACE wrap's         Symptoms of urinary tract infection - Primary     Had reported burning   U/A - abnormal  waiting for urine culture  Denies any dysuria, burning or temperature   Encourage fluids           Medications,  treatments, and labs reviewed  Continue medications and treatments as listed in EMR    ELIZABETH Howard      Electronically Signed By: ELIZABETH Howard   4/9/24  7:52 PM

## 2024-04-09 NOTE — PROGRESS NOTES
PROGRESS NOTE    Subjective   Chief complaint: Mildred Nolen is a 89 y.o. female who is a long term care patient being seen and evaluated for symptoms of potential UTI.     HPI: Reviewed urinalysis, abnormal. There is bacteria evident. Waiting for urine culture.   She is pleasant and talkative. Currently denies any dysuria or burning sensation. Remains afebrile. Nursing reported that she had reported burning sensation over the weekend. Encourage fluids   HPI      Objective   Vital signs: 127/67-72-18-97.4     Physical Exam  Vitals and nursing note reviewed.   Constitutional:       General: She is not in acute distress.     Appearance: She is obese.   Eyes:      Extraocular Movements: Extraocular movements intact.   Cardiovascular:      Rate and Rhythm: Normal rate and regular rhythm.      Comments: Lower legs wrapped with ace wraps   Has persistent chronic edema   Pulmonary:      Effort: Pulmonary effort is normal.      Breath sounds: Normal breath sounds.   Abdominal:      General: Bowel sounds are normal.      Palpations: Abdomen is soft.      Comments: Obese   No tenderness to palpation   Bladder not distended   Musculoskeletal:      Cervical back: Neck supple.   Neurological:      Mental Status: She is alert.   Psychiatric:         Mood and Affect: Mood normal.         Behavior: Behavior is cooperative.         Assessment/Plan   Problem List Items Addressed This Visit       COPD (chronic obstructive pulmonary disease) (CMS/[facility])     Lungs clear.   No wheezing   Denies any SOB          Bilateral cellulitis of lower leg     Venous stasis  Lasix   Remains on antibiotics   doxycycline until completed 4/10/2024  Continue to monitor  ACE wrap's         Symptoms of urinary tract infection - Primary     Had reported burning   U/A - abnormal  waiting for urine culture  Denies any dysuria, burning or temperature   Encourage fluids           Medications, treatments, and labs reviewed  Continue medications and treatments as  listed in EMR    Lauren Cerna, APRN-CNP

## 2024-04-10 ENCOUNTER — NURSING HOME VISIT (OUTPATIENT)
Dept: POST ACUTE CARE | Facility: EXTERNAL LOCATION | Age: 89
End: 2024-04-10
Payer: MEDICARE

## 2024-04-10 DIAGNOSIS — J44.9 CHRONIC OBSTRUCTIVE PULMONARY DISEASE, UNSPECIFIED COPD TYPE (MULTI): ICD-10-CM

## 2024-04-10 DIAGNOSIS — R41.0 CONFUSION: Primary | ICD-10-CM

## 2024-04-10 DIAGNOSIS — I10 PRIMARY HYPERTENSION: ICD-10-CM

## 2024-04-10 PROCEDURE — 99308 SBSQ NF CARE LOW MDM 20: CPT | Performed by: INTERNAL MEDICINE

## 2024-04-10 NOTE — LETTER
Patient: Mildred Nolen  : 1930    Encounter Date: 04/10/2024    PROGRESS NOTE    Subjective  Chief complaint: Mildred Nolen is a 89 y.o. female who is a long term care patient being seen and evaluated for increased confusion    HPI:  HPI  Patient is seen for increased confusion.  UA was obtained which was negative.  Patient is reportedly to be back to baseline mental status.  Patient reports no urinary tract symptoms.    Objective  Vital signs: 128/66, 68    Physical Exam  Constitutional:       General: She is not in acute distress.  Eyes:      Extraocular Movements: Extraocular movements intact.   Cardiovascular:      Rate and Rhythm: Regular rhythm.   Pulmonary:      Effort: Pulmonary effort is normal.      Breath sounds: Normal breath sounds.      Comments: O2  Abdominal:      General: Bowel sounds are normal.      Palpations: Abdomen is soft.   Musculoskeletal:      Cervical back: Neck supple.      Right lower leg: Edema present.      Left lower leg: Edema present.      Comments: Ace wrap's to bilateral lower extremities   Neurological:      Mental Status: She is alert.   Psychiatric:         Mood and Affect: Mood normal.         Behavior: Behavior is cooperative.         Assessment/Plan  Problem List Items Addressed This Visit       HTN (hypertension)     Monitor BP   continue with antihypertensive meds            COPD (chronic obstructive pulmonary disease) (CMS/[facility])     Lungs clear.   No wheezing   Denies any SOB          Confusion - Primary     UA obtained negative  Improved, back to baseline.  Continue to monitor          Medications, treatments, and labs reviewed  Continue medications and treatments as listed in EMR    Scribe Attestation  I, Joseph Landis   attest that this documentation has been prepared under the direction and in the presence of Nadir Black MD    Provider Attestation - Scribe documentation  All medical record entries made by the Scribe were at my direction and  personally dictated by me. I have reviewed the chart and agree that the record accurately reflects my personal performance of the history, physical exam, discussion and plan.   Nadir Black MD            Electronically Signed By: Nadir Black MD   4/11/24  4:26 PM

## 2024-04-11 PROBLEM — R41.0 CONFUSION: Status: ACTIVE | Noted: 2024-04-11

## 2024-04-11 NOTE — PROGRESS NOTES
PROGRESS NOTE    Subjective   Chief complaint: Mildred Nolen is a 89 y.o. female who is a long term care patient being seen and evaluated for increased confusion    HPI:  HPI  Patient is seen for increased confusion.  UA was obtained which was negative.  Patient is reportedly to be back to baseline mental status.  Patient reports no urinary tract symptoms.    Objective   Vital signs: 128/66, 68    Physical Exam  Constitutional:       General: She is not in acute distress.  Eyes:      Extraocular Movements: Extraocular movements intact.   Cardiovascular:      Rate and Rhythm: Regular rhythm.   Pulmonary:      Effort: Pulmonary effort is normal.      Breath sounds: Normal breath sounds.      Comments: O2  Abdominal:      General: Bowel sounds are normal.      Palpations: Abdomen is soft.   Musculoskeletal:      Cervical back: Neck supple.      Right lower leg: Edema present.      Left lower leg: Edema present.      Comments: Ace wrap's to bilateral lower extremities   Neurological:      Mental Status: She is alert.   Psychiatric:         Mood and Affect: Mood normal.         Behavior: Behavior is cooperative.         Assessment/Plan   Problem List Items Addressed This Visit       HTN (hypertension)     Monitor BP   continue with antihypertensive meds            COPD (chronic obstructive pulmonary disease) (CMS/[facility])     Lungs clear.   No wheezing   Denies any SOB          Confusion - Primary     UA obtained negative  Improved, back to baseline.  Continue to monitor          Medications, treatments, and labs reviewed  Continue medications and treatments as listed in EMR    Scribe Attestation  I, Joseph Landis   attest that this documentation has been prepared under the direction and in the presence of Nadir Black MD    Provider Attestation - Scribe documentation  All medical record entries made by the Scribe were at my direction and personally dictated by me. I have reviewed the chart and agree that the  record accurately reflects my personal performance of the history, physical exam, discussion and plan.   Nadir Black MD

## 2024-04-12 ENCOUNTER — NURSING HOME VISIT (OUTPATIENT)
Dept: POST ACUTE CARE | Facility: EXTERNAL LOCATION | Age: 89
End: 2024-04-12
Payer: MEDICARE

## 2024-04-12 DIAGNOSIS — J44.9 CHRONIC OBSTRUCTIVE PULMONARY DISEASE, UNSPECIFIED COPD TYPE (MULTI): ICD-10-CM

## 2024-04-12 DIAGNOSIS — L03.115 BILATERAL CELLULITIS OF LOWER LEG: Primary | ICD-10-CM

## 2024-04-12 DIAGNOSIS — I10 PRIMARY HYPERTENSION: ICD-10-CM

## 2024-04-12 DIAGNOSIS — L03.116 BILATERAL CELLULITIS OF LOWER LEG: Primary | ICD-10-CM

## 2024-04-12 PROCEDURE — 99308 SBSQ NF CARE LOW MDM 20: CPT | Performed by: INTERNAL MEDICINE

## 2024-04-12 NOTE — LETTER
Patient: Mildred Nolen  : 1930    Encounter Date: 2024    PROGRESS NOTE    Subjective  Chief complaint: Mildred Nolen is a 89 y.o. female who is a long term care patient being seen and evaluated for cellulitis.    HPI:  HPI  Patient is seen in follow-up of cellulitis.  Patient continues to be noncompliant with elevation of bilateral lower extremities.  Patient does continue with Ace wraps.  Patient to start on Keflex x 7 days.  Patient was seen and examined at bedside, appears to be in no acute distress.  Denies chest pain or shortness of breath..  Denies fever or chills    Objective  Vital signs: 126/79, 61    Physical Exam  Constitutional:       General: She is not in acute distress.  Eyes:      Extraocular Movements: Extraocular movements intact.   Cardiovascular:      Rate and Rhythm: Regular rhythm.   Pulmonary:      Effort: Pulmonary effort is normal.      Breath sounds: Normal breath sounds.      Comments: O2  Abdominal:      General: Bowel sounds are normal.      Palpations: Abdomen is soft.   Musculoskeletal:      Cervical back: Neck supple.      Right lower leg: Edema present.      Left lower leg: Edema present.      Comments: Ace wrap's to bilateral lower extremities  Bilateral lower extremity erythema, tender to palpation   Neurological:      Mental Status: She is alert.   Psychiatric:         Mood and Affect: Mood normal.         Behavior: Behavior is cooperative.         Assessment/Plan  Problem List Items Addressed This Visit       HTN (hypertension)     Monitor BP   continue with antihypertensive meds          COPD (chronic obstructive pulmonary disease) (Multi)     Lungs clear.   No wheezing   Denies any SOB          Bilateral cellulitis of lower leg - Primary     Continue Keflex until complete.  Elevate as able, patient refuses  Continue Ace wrap's  Lasix          Medications, treatments, and labs reviewed  Continue medications and treatments as listed in EMR    Scribe Attestation  I,  Darlyn Landisibe   attest that this documentation has been prepared under the direction and in the presence of Nadir Black MD    Provider Attestation - Scribe documentation  All medical record entries made by the Scribe were at my direction and personally dictated by me. I have reviewed the chart and agree that the record accurately reflects my personal performance of the history, physical exam, discussion and plan.   Nadir Black MD            Electronically Signed By: Nadir Black MD   4/13/24  5:41 PM

## 2024-04-12 NOTE — PROGRESS NOTES
PROGRESS NOTE    Subjective   Chief complaint: Mildred Nolen is a 89 y.o. female who is a long term care patient being seen and evaluated for cellulitis.    HPI:  HPI  Patient is seen in follow-up of cellulitis.  Patient continues to be noncompliant with elevation of bilateral lower extremities.  Patient does continue with Ace wraps.  Patient to start on Keflex x 7 days.  Patient was seen and examined at bedside, appears to be in no acute distress.  Denies chest pain or shortness of breath..  Denies fever or chills    Objective   Vital signs: 126/79, 61    Physical Exam  Constitutional:       General: She is not in acute distress.  Eyes:      Extraocular Movements: Extraocular movements intact.   Cardiovascular:      Rate and Rhythm: Regular rhythm.   Pulmonary:      Effort: Pulmonary effort is normal.      Breath sounds: Normal breath sounds.      Comments: O2  Abdominal:      General: Bowel sounds are normal.      Palpations: Abdomen is soft.   Musculoskeletal:      Cervical back: Neck supple.      Right lower leg: Edema present.      Left lower leg: Edema present.      Comments: Ace wrap's to bilateral lower extremities  Bilateral lower extremity erythema, tender to palpation   Neurological:      Mental Status: She is alert.   Psychiatric:         Mood and Affect: Mood normal.         Behavior: Behavior is cooperative.         Assessment/Plan   Problem List Items Addressed This Visit       HTN (hypertension)     Monitor BP   continue with antihypertensive meds          COPD (chronic obstructive pulmonary disease) (Multi)     Lungs clear.   No wheezing   Denies any SOB          Bilateral cellulitis of lower leg - Primary     Continue Keflex until complete.  Elevate as able, patient refuses  Continue Ace wrap's  Lasix          Medications, treatments, and labs reviewed  Continue medications and treatments as listed in EMR    Scribe Attestation  I, Ana Grimm, Darlynibe   attest that this documentation has been  prepared under the direction and in the presence of Nadir Black MD    Provider Attestation - Scribe documentation  All medical record entries made by the Scribe were at my direction and personally dictated by me. I have reviewed the chart and agree that the record accurately reflects my personal performance of the history, physical exam, discussion and plan.   Nadir Black MD

## 2024-04-16 ENCOUNTER — NURSING HOME VISIT (OUTPATIENT)
Dept: POST ACUTE CARE | Facility: EXTERNAL LOCATION | Age: 89
End: 2024-04-16
Payer: MEDICARE

## 2024-04-16 DIAGNOSIS — J44.9 CHRONIC OBSTRUCTIVE PULMONARY DISEASE, UNSPECIFIED COPD TYPE (MULTI): ICD-10-CM

## 2024-04-16 DIAGNOSIS — L03.115 BILATERAL CELLULITIS OF LOWER LEG: Primary | ICD-10-CM

## 2024-04-16 DIAGNOSIS — L03.116 BILATERAL CELLULITIS OF LOWER LEG: Primary | ICD-10-CM

## 2024-04-16 PROCEDURE — 99308 SBSQ NF CARE LOW MDM 20: CPT | Performed by: NURSE PRACTITIONER

## 2024-04-16 NOTE — LETTER
Patient: Mildred Nolen  : 1930    Encounter Date: 2024    PROGRESS NOTE    Subjective  Chief complaint: Mildred Nolen is a 89 y.o. female who is a long term care patient being seen and evaluated for required assessment for medical necessity new wheelchair.     HPI: has been evaluated by therapy for new wheelchair that will allow for elevation of her legs. Remains with extensive lymphedema in her lower legs. Her legs are currently positioned in a dependent manner. Is wearing ace wraps for compression. Remains on antibiotics for persistent cellulitis. Cooperative with this visit.   HPI      Objective  Vital signs: 128/67-72-18-97.4     Physical Exam  Nursing note reviewed.   Constitutional:       General: She is not in acute distress.  Eyes:      Extraocular Movements: Extraocular movements intact.   Cardiovascular:      Comments: Has extensive lymphedema  Wrapped with ace wraps for light compression   Pulmonary:      Effort: Pulmonary effort is normal.      Comments: Lungs clear  Musculoskeletal:      Cervical back: Neck supple.   Neurological:      Mental Status: She is alert.   Psychiatric:         Mood and Affect: Mood normal.         Behavior: Behavior is cooperative.         Assessment/Plan  Problem List Items Addressed This Visit       COPD (chronic obstructive pulmonary disease) (Multi)     Lungs clear.   No wheezing   Denies any SOB          Bilateral cellulitis of lower leg - Primary     Remains on antibiotics. Her legs are wrapped with ace compression wraps  Her legs not inspected at this visit  Has been evaluated for customized chair that would allow elevation.   Has extensive persistent lymphedema.             Medications, treatments, and labs reviewed  Continue medications and treatments as listed in EMR    ELIZABETH Howard      Electronically Signed By: ELIZABETH Howard   24  8:19 AM

## 2024-04-17 ENCOUNTER — NURSING HOME VISIT (OUTPATIENT)
Dept: POST ACUTE CARE | Facility: EXTERNAL LOCATION | Age: 89
End: 2024-04-17
Payer: MEDICARE

## 2024-04-17 DIAGNOSIS — L03.116 BILATERAL CELLULITIS OF LOWER LEG: Primary | ICD-10-CM

## 2024-04-17 DIAGNOSIS — L03.115 BILATERAL CELLULITIS OF LOWER LEG: Primary | ICD-10-CM

## 2024-04-17 DIAGNOSIS — J44.9 CHRONIC OBSTRUCTIVE PULMONARY DISEASE, UNSPECIFIED COPD TYPE (MULTI): ICD-10-CM

## 2024-04-17 DIAGNOSIS — I10 PRIMARY HYPERTENSION: ICD-10-CM

## 2024-04-17 PROCEDURE — 99308 SBSQ NF CARE LOW MDM 20: CPT | Performed by: INTERNAL MEDICINE

## 2024-04-17 NOTE — PROGRESS NOTES
PROGRESS NOTE    Subjective   Chief complaint: Mildred Nolen is a 89 y.o. female who is a long term care patient being seen and evaluated for cellulitis.    HPI:  HPI  Patient is seen in follow-up of cellulitis.  Patient does continue on antibiotic for cellulitis, tolerating without adverse effects.  Patient does continue with Ace wrap's to bilateral lower extremities.  Patient was seen and examined at bedside, appears to be in no acute distress.  Denies fever or chills.  Denies chest pain or shortness of breath.    Objective   Vital signs: 132/7062    Physical Exam  Constitutional:       General: She is not in acute distress.  Eyes:      Extraocular Movements: Extraocular movements intact.   Cardiovascular:      Rate and Rhythm: Regular rhythm.   Pulmonary:      Effort: Pulmonary effort is normal.      Breath sounds: Normal breath sounds.      Comments: O2  Abdominal:      General: Bowel sounds are normal.      Palpations: Abdomen is soft.   Musculoskeletal:      Cervical back: Neck supple.      Right lower leg: Edema present.      Left lower leg: Edema present.      Comments: Ace wrap's to bilateral lower extremities  Bilateral lower extremity erythema, tender to palpation   Neurological:      Mental Status: She is alert.   Psychiatric:         Mood and Affect: Mood normal.         Behavior: Behavior is cooperative.         Assessment/Plan   Problem List Items Addressed This Visit       HTN (hypertension)     Monitor BP   continue with antihypertensive meds          COPD (chronic obstructive pulmonary disease) (Multi)     Lungs clear.   No wheezing   Denies any SOB          Bilateral cellulitis of lower leg - Primary     Continue ATB until complete.  4/19/2024  Elevate as able, patient refuses  Continue Ace wrap's  Lasix          Medications, treatments, and labs reviewed  Continue medications and treatments as listed in EMR    Scribe Attestation  I, Ana Grimm, Scribe   attest that this documentation has been  prepared under the direction and in the presence of Nadir Black MD    Provider Attestation - Scribe documentation  All medical record entries made by the Scribe were at my direction and personally dictated by me. I have reviewed the chart and agree that the record accurately reflects my personal performance of the history, physical exam, discussion and plan.   Nadir Black MD

## 2024-04-17 NOTE — ASSESSMENT & PLAN NOTE
Continue ATB until complete.  4/19/2024  Elevate as able, patient refuses  Continue Ace wrap's  Lasix

## 2024-04-17 NOTE — LETTER
Patient: Mildred Nolen  : 1930    Encounter Date: 2024    PROGRESS NOTE    Subjective  Chief complaint: Mildred Nolen is a 89 y.o. female who is a long term care patient being seen and evaluated for cellulitis.    HPI:  HPI  Patient is seen in follow-up of cellulitis.  Patient does continue on antibiotic for cellulitis, tolerating without adverse effects.  Patient does continue with Ace wrap's to bilateral lower extremities.  Patient was seen and examined at bedside, appears to be in no acute distress.  Denies fever or chills.  Denies chest pain or shortness of breath.    Objective  Vital signs: 132/7062    Physical Exam  Constitutional:       General: She is not in acute distress.  Eyes:      Extraocular Movements: Extraocular movements intact.   Cardiovascular:      Rate and Rhythm: Regular rhythm.   Pulmonary:      Effort: Pulmonary effort is normal.      Breath sounds: Normal breath sounds.      Comments: O2  Abdominal:      General: Bowel sounds are normal.      Palpations: Abdomen is soft.   Musculoskeletal:      Cervical back: Neck supple.      Right lower leg: Edema present.      Left lower leg: Edema present.      Comments: Ace wrap's to bilateral lower extremities  Bilateral lower extremity erythema, tender to palpation   Neurological:      Mental Status: She is alert.   Psychiatric:         Mood and Affect: Mood normal.         Behavior: Behavior is cooperative.         Assessment/Plan  Problem List Items Addressed This Visit       HTN (hypertension)     Monitor BP   continue with antihypertensive meds          COPD (chronic obstructive pulmonary disease) (Multi)     Lungs clear.   No wheezing   Denies any SOB          Bilateral cellulitis of lower leg - Primary     Continue ATB until complete.  2024  Elevate as able, patient refuses  Continue Ace wrap's  Lasix          Medications, treatments, and labs reviewed  Continue medications and treatments as listed in EMR    Scribe  Attestation  I, Joseph Landis   attest that this documentation has been prepared under the direction and in the presence of Nadir Black MD    Provider Attestation - Scribe documentation  All medical record entries made by the Scribe were at my direction and personally dictated by me. I have reviewed the chart and agree that the record accurately reflects my personal performance of the history, physical exam, discussion and plan.   Nadir Black MD            Electronically Signed By: Nadir Black MD   4/17/24  3:57 PM

## 2024-04-19 ENCOUNTER — NURSING HOME VISIT (OUTPATIENT)
Dept: POST ACUTE CARE | Facility: EXTERNAL LOCATION | Age: 89
End: 2024-04-19
Payer: MEDICARE

## 2024-04-19 DIAGNOSIS — L03.115 BILATERAL CELLULITIS OF LOWER LEG: Primary | ICD-10-CM

## 2024-04-19 DIAGNOSIS — J44.9 CHRONIC OBSTRUCTIVE PULMONARY DISEASE, UNSPECIFIED COPD TYPE (MULTI): ICD-10-CM

## 2024-04-19 DIAGNOSIS — I10 PRIMARY HYPERTENSION: ICD-10-CM

## 2024-04-19 DIAGNOSIS — L03.116 BILATERAL CELLULITIS OF LOWER LEG: Primary | ICD-10-CM

## 2024-04-19 PROCEDURE — 99308 SBSQ NF CARE LOW MDM 20: CPT | Performed by: INTERNAL MEDICINE

## 2024-04-19 NOTE — ASSESSMENT & PLAN NOTE
Continue ATB until complete.  4/19/2024  Elevate as able, patient refuses  Continue Ace wrap's  Lasix  Low-sodium diet

## 2024-04-19 NOTE — PROGRESS NOTES
PROGRESS NOTE    Subjective   Chief complaint: Mildred Nolen is a 89 y.o. female who is a long term care patient being seen and evaluated for follow-up.    HPI:  HPI  Patient is seen in follow-up of cellulitis, patient does continue antibiotic, tolerating without adverse effects.  Patient is completing antibiotic today.  Patient to continue on low-sodium diet, elevate legs as able and compression.  Patient seen and examined at bedside, appears to be in no acute distress.    Objective   Vital signs: 111/59, 65    Physical Exam  Constitutional:       General: She is not in acute distress.  Eyes:      Extraocular Movements: Extraocular movements intact.   Cardiovascular:      Rate and Rhythm: Regular rhythm.   Pulmonary:      Effort: Pulmonary effort is normal.      Breath sounds: Normal breath sounds.      Comments: O2  Abdominal:      General: Bowel sounds are normal.      Palpations: Abdomen is soft.   Musculoskeletal:      Cervical back: Neck supple.      Right lower leg: Edema present.      Left lower leg: Edema present.      Comments: Ace wrap's to bilateral lower extremities  Bilateral lower extremity erythema, tender to palpation   Neurological:      Mental Status: She is alert.   Psychiatric:         Mood and Affect: Mood normal.         Behavior: Behavior is cooperative.         Assessment/Plan   Problem List Items Addressed This Visit       HTN (hypertension)     Monitor BP   continue with antihypertensive meds   RENNY diet         COPD (chronic obstructive pulmonary disease) (Multi)     Lungs clear.   No wheezing   Denies any SOB          Bilateral cellulitis of lower leg - Primary     Continue ATB until complete.  4/19/2024  Elevate as able, patient refuses  Continue Ace wrap's  Lasix  Low-sodium diet          Medications, treatments, and labs reviewed  Continue medications and treatments as listed in EMR    Scribe Attestation  I, Ana Grimm, Joseph   attest that this documentation has been prepared  under the direction and in the presence of Nadir Black MD    Provider Attestation - Scribe documentation  All medical record entries made by the Scribe were at my direction and personally dictated by me. I have reviewed the chart and agree that the record accurately reflects my personal performance of the history, physical exam, discussion and plan.   Nadir Black MD

## 2024-04-19 NOTE — LETTER
Patient: Mildred Nolen  : 1930    Encounter Date: 2024    PROGRESS NOTE    Subjective  Chief complaint: Mildred Nolen is a 89 y.o. female who is a long term care patient being seen and evaluated for follow-up.    HPI:  HPI  Patient is seen in follow-up of cellulitis, patient does continue antibiotic, tolerating without adverse effects.  Patient is completing antibiotic today.  Patient to continue on low-sodium diet, elevate legs as able and compression.  Patient seen and examined at bedside, appears to be in no acute distress.    Objective  Vital signs: 111/59, 65    Physical Exam  Constitutional:       General: She is not in acute distress.  Eyes:      Extraocular Movements: Extraocular movements intact.   Cardiovascular:      Rate and Rhythm: Regular rhythm.   Pulmonary:      Effort: Pulmonary effort is normal.      Breath sounds: Normal breath sounds.      Comments: O2  Abdominal:      General: Bowel sounds are normal.      Palpations: Abdomen is soft.   Musculoskeletal:      Cervical back: Neck supple.      Right lower leg: Edema present.      Left lower leg: Edema present.      Comments: Ace wrap's to bilateral lower extremities  Bilateral lower extremity erythema, tender to palpation   Neurological:      Mental Status: She is alert.   Psychiatric:         Mood and Affect: Mood normal.         Behavior: Behavior is cooperative.         Assessment/Plan  Problem List Items Addressed This Visit       HTN (hypertension)     Monitor BP   continue with antihypertensive meds   RENNY diet         COPD (chronic obstructive pulmonary disease) (Multi)     Lungs clear.   No wheezing   Denies any SOB          Bilateral cellulitis of lower leg - Primary     Continue ATB until complete.  2024  Elevate as able, patient refuses  Continue Ace wrap's  Lasix  Low-sodium diet          Medications, treatments, and labs reviewed  Continue medications and treatments as listed in EMR    Scribe Attestation  IAna  Joseph Girmm   attest that this documentation has been prepared under the direction and in the presence of Nadir Black MD    Provider Attestation - Scribe documentation  All medical record entries made by the Scribe were at my direction and personally dictated by me. I have reviewed the chart and agree that the record accurately reflects my personal performance of the history, physical exam, discussion and plan.   Nadir Black MD            Electronically Signed By: Nadir Black MD   4/19/24  4:47 PM

## 2024-04-20 NOTE — PROGRESS NOTES
PROGRESS NOTE    Subjective   Chief complaint: Mildred Nolen is a 89 y.o. female who is a long term care patient being seen and evaluated for required assessment for medical necessity new wheelchair.     HPI: has been evaluated by therapy for new wheelchair that will allow for elevation of her legs. Remains with extensive lymphedema in her lower legs. Her legs are currently positioned in a dependent manner. Is wearing ace wraps for compression. Remains on antibiotics for persistent cellulitis. Cooperative with this visit.   HPI      Objective   Vital signs: 128/67-72-18-97.4     Physical Exam  Nursing note reviewed.   Constitutional:       General: She is not in acute distress.  Eyes:      Extraocular Movements: Extraocular movements intact.   Cardiovascular:      Comments: Has extensive lymphedema  Wrapped with ace wraps for light compression   Pulmonary:      Effort: Pulmonary effort is normal.      Comments: Lungs clear  Musculoskeletal:      Cervical back: Neck supple.   Neurological:      Mental Status: She is alert.   Psychiatric:         Mood and Affect: Mood normal.         Behavior: Behavior is cooperative.         Assessment/Plan   Problem List Items Addressed This Visit       COPD (chronic obstructive pulmonary disease) (Multi)     Lungs clear.   No wheezing   Denies any SOB          Bilateral cellulitis of lower leg - Primary     Remains on antibiotics. Her legs are wrapped with ace compression wraps  Her legs not inspected at this visit  Has been evaluated for customized chair that would allow elevation.   Has extensive persistent lymphedema.             Medications, treatments, and labs reviewed  Continue medications and treatments as listed in EMR    Lauren Cerna, APRN-CNP

## 2024-04-20 NOTE — ASSESSMENT & PLAN NOTE
Remains on antibiotics. Her legs are wrapped with ace compression wraps  Her legs not inspected at this visit  Has been evaluated for customized chair that would allow elevation.   Has extensive persistent lymphedema.

## 2024-04-24 ENCOUNTER — NURSING HOME VISIT (OUTPATIENT)
Dept: POST ACUTE CARE | Facility: EXTERNAL LOCATION | Age: 89
End: 2024-04-24
Payer: MEDICARE

## 2024-04-24 DIAGNOSIS — I10 PRIMARY HYPERTENSION: ICD-10-CM

## 2024-04-24 DIAGNOSIS — K21.9 GASTROESOPHAGEAL REFLUX DISEASE WITHOUT ESOPHAGITIS: ICD-10-CM

## 2024-04-24 DIAGNOSIS — J44.9 CHRONIC OBSTRUCTIVE PULMONARY DISEASE, UNSPECIFIED COPD TYPE (MULTI): ICD-10-CM

## 2024-04-24 DIAGNOSIS — Z00.00 ENCOUNTER FOR ANNUAL WELLNESS VISIT (AWV) IN MEDICARE PATIENT: ICD-10-CM

## 2024-04-24 DIAGNOSIS — R60.9 EDEMA, UNSPECIFIED TYPE: Primary | ICD-10-CM

## 2024-04-24 PROCEDURE — G0439 PPPS, SUBSEQ VISIT: HCPCS | Performed by: INTERNAL MEDICINE

## 2024-04-24 PROCEDURE — 99214 OFFICE O/P EST MOD 30 MIN: CPT | Performed by: INTERNAL MEDICINE

## 2024-04-24 NOTE — LETTER
Patient: Mildred Nolen  : 1930    Encounter Date: 2024    ANNUAL WELLNESS VISIT    Subjective:  Chief Complaint: Mildred Nolen is an 89 y.o.  Non- female who presents for annual wellness visit, general medical care, and follow-up chronic conditions.    HPI:  HPI Patient seen and examined at bedside for annual wellness visit nd physical. Denies constitutional symptoms. No new concerns according to nursing staff. No acute distress.     Past Medical History:   Diagnosis Date   • CHF (congestive heart failure) (Multi)    • COPD (chronic obstructive pulmonary disease) (Multi)    • Diabetes mellitus (Multi)    • GERD (gastroesophageal reflux disease)        No past surgical history on file.    Family History   Problem Relation Name Age of Onset   • No Known Problems Mother     • No Known Problems Father         Social History     Socioeconomic History   • Marital status: Unknown     Spouse name: None   • Number of children: None   • Years of education: None   • Highest education level: None   Occupational History   • None   Tobacco Use   • Smoking status: Never     Passive exposure: Never   • Smokeless tobacco: Never   Vaping Use   • Vaping status: Never Used   Substance and Sexual Activity   • Alcohol use: Never   • Drug use: Never   • Sexual activity: Not Currently   Other Topics Concern   • None   Social History Narrative   • None     Social Determinants of Health     Financial Resource Strain: Low Risk  (2020)    Received from [facility]    Overall Financial Resource Strain (CARDIA)    • Difficulty of Paying Living Expenses: Not hard at all   Food Insecurity: No Food Insecurity (2020)    Received from [facility]    Hunger Vital Sign    • Worried About Running Out of Food in the Last Year: Never true    • Ran Out of Food in the Last Year: Never true   Transportation Needs: No Transportation Needs (2020)    Received from [facility]    PRAPARE - Transportation  "   • Lack of Transportation (Medical): No    • Lack of Transportation (Non-Medical): No   Physical Activity: Not on file   Stress: Not on file   Social Connections: Not on file   Intimate Partner Violence: Not on file   Housing Stability: Not on file       Objective  /72   Ht 1.6 m (5' 3\")   Wt 88.5 kg (195 lb)   BMI 34.54 kg/m²     Physical Exam  Constitutional:       General: She is not in acute distress.     Appearance: She is obese.   Eyes:      Extraocular Movements: Extraocular movements intact.   Pulmonary:      Effort: Pulmonary effort is normal.   Musculoskeletal:      Cervical back: Neck supple.      Right lower leg: Edema present.      Left lower leg: Edema present.   Neurological:      Mental Status: She is alert.   Psychiatric:         Mood and Affect: Mood normal.         Behavior: Behavior is cooperative.         Imaging:  No results found.     Labs reviewed:    Lab Results   Component Value Date    WBC 4.9 05/04/2024    HGB 12.8 05/04/2024    HCT 40.9 05/04/2024     (L) 05/04/2024    ALT 5 (L) 05/04/2024    AST 11 05/04/2024     05/04/2024    K 3.7 05/04/2024     05/04/2024    CREATININE 1.23 (H) 05/04/2024    BUN 16 05/04/2024    CO2 26 05/04/2024    INR 1.2 (H) 11/07/2020       Past Medical, Surgical, and Family History reviewed and updated in chart  Allergies reviewed and updated in facility record  All medications and treatments were reviewed in the local facility record  Nursing assessments/notes were reviewed in the local facility record    List of current healthcare providers:  Patient Care Team:  Nadir Black MD as PCP - General (Internal Medicine)     HRA:  Over the past 2 weeks, how often have you been bothered by any of the following problems?  Little interest or pleasure in doing things: Not at all  Feeling down, depressed, or hopeless: Not at all  Patient Health Questionnaire-2 Score: 0    Steadi Fall Risk  One or more falls in the last year? No  How many " Times?    Was the patient injured in the fall?    Has trouble stepping onto curb? Yes  Advised to use a cane or walker to get around safely? Yes  Often has to rush to toilet? No  Feels unsteady when walking? Yes  Has lost some feeling in feet? Yes  Often feels sad or depressed? No  Steadies self on furniture while walking at home? No  Takes medicine that makes them feel lightheaded or more tired than usual? Yes  Worried about Falling? Yes  Takes medicine to sleep or improve mood? Yes  Needs to push with hands when rising from a chair? Yes            Falls Home Safety Risk Factors  Home Safety Risk Factors: None    Functional Ability/Level of Safety  Cognitive Impairment Observed: No cognitive impairment observed  Cognitive Impairment Reported: No cognitive impairment reported by patient or family    Patient Self Assessment of Health Status  Patient Self Assessment: Fair         ADL Screening  Hearing - Right Ear: Difficulty with noise  Hearing - Left Ear: Difficulty with noise  Bathing: Needs assistance  Dressing: Needs assistance  Walks in Home: Needs assistance         Psychosocial risks:  Do you feel sad - no  Do you feel stressed - no  Do you feel lonely - yes  Do you feel fatigued - no  Do you have any pain - no    Assessment/Plan:  Problem List Items Addressed This Visit       HTN (hypertension)     Monitor BP   continue with antihypertensive meds   RENNY diet         Gastroesophageal reflux disease without esophagitis       Continue Zofran for nausea.  Continue PPI  probiotic  monitor         COPD (chronic obstructive pulmonary disease) (Multi)     Lungs clear.   No wheezing   Denies any SOB          Edema - Primary     Chronic, edema improving   continue Lasix  Compression stocking         Encounter for annual wellness visit (AWV) in Medicare patient     The following health maintenance schedule was reviewed with the patient and provided in printed form in the after visit summary:  Health Maintenance   Topic  Date Due   • Echocardiogram  05/15/2024 (Originally 6/24/1930)   • RSV Pregnant patients and/or  patients aged 60+ years (1 - 1-dose 60+ series) 05/15/2024 (Originally 6/24/1990)   • Lipid Panel  05/15/2024 (Originally 6/24/1930)   • Pneumococcal Vaccine: 65+ Years (2 of 2 - PCV) 05/15/2024 (Originally 3/1/2022)   • Zoster Vaccines (1 of 2) 05/15/2024 (Originally [birth year]/24/1980)   • Bone Density Scan  05/15/2024 (Originally 6/24/1930)   • COVID-19 Vaccine (1 - 2023-24 season) 07/31/2024 (Originally 9/1/2023)   • Influenza Vaccine (Season Ended) 09/01/2024   • Medicare Annual Wellness Visit (AWV)  04/25/2025   • Creatinine Level  05/04/2025   • Potassium Level  05/04/2025   • HIB Vaccines  Aged Out   • Hepatitis B Vaccines  Aged Out   • IPV Vaccines  Aged Out   • Hepatitis A Vaccines  Aged Out   • Meningococcal Vaccine  Aged Out   • Rotavirus Vaccines  Aged Out   • HPV Vaccines  Aged Out   • DTaP/Tdap/Td Vaccines  Discontinued     Will obtain the following labs at nursing facility:  BMP     Continue current medications as listed in facility EMR  Time spent 30 minutes (32113)     Scribe Attestation  Tahmina CHRISTIANSON Scribe   attest that this documentation has been prepared under the direction and in the presence of Nadir Black MD.    Provider Attestation - Scribe documentation  All medical record entries made by the Scribe were at my direction and personally dictated by me. I have reviewed the chart and agree that the record accurately reflects my personal performance of the history, physical exam, discussion and plan.        Electronically Signed By: Nadir Black MD   5/5/24  3:37 PM

## 2024-04-25 ENCOUNTER — NURSING HOME VISIT (OUTPATIENT)
Dept: POST ACUTE CARE | Facility: EXTERNAL LOCATION | Age: 89
End: 2024-04-25
Payer: MEDICARE

## 2024-04-25 DIAGNOSIS — J44.9 CHRONIC OBSTRUCTIVE PULMONARY DISEASE, UNSPECIFIED COPD TYPE (MULTI): ICD-10-CM

## 2024-04-25 DIAGNOSIS — I10 PRIMARY HYPERTENSION: Primary | ICD-10-CM

## 2024-04-25 DIAGNOSIS — G89.29 OTHER CHRONIC PAIN: ICD-10-CM

## 2024-04-25 PROCEDURE — 99308 SBSQ NF CARE LOW MDM 20: CPT | Performed by: NURSE PRACTITIONER

## 2024-04-25 NOTE — LETTER
Patient: Mildred Nolen  : 1930    Encounter Date: 2024    PROGRESS NOTE    Subjective  Chief complaint: Mildred Nolen is a 89 y.o. female who is a long term care patient being seen and evaluated for evaluate pain management     HPI: Reviewed her current pain management order. She is currently on percocet 3 x day. She reports that it reduces the pain in her legs. Reports that her pain remain 8-9/10 without medication. Both legs remain with extensive dependent edema. Wrapped with ace wraps.   HPI      Objective  Vital signs: 128/67-72-18-97/4     Physical Exam  Vitals and nursing note reviewed.   Constitutional:       General: She is not in acute distress.  Eyes:      Extraocular Movements: Extraocular movements intact.   Cardiovascular:      Comments: Remains with extensive lymphedema in both lower legs. Her legs are wrapped with ace wraps.   Pulmonary:      Effort: Pulmonary effort is normal.   Musculoskeletal:      Cervical back: Neck supple.   Neurological:      Mental Status: She is alert.   Psychiatric:         Mood and Affect: Mood normal.         Behavior: Behavior is cooperative.         Assessment/Plan  Problem List Items Addressed This Visit       HTN (hypertension) - Primary     Monitor BP   continue with antihypertensive meds   RENNY diet         COPD (chronic obstructive pulmonary disease) (Multi)     Lungs clear.   No wheezing   Denies any SOB          Other chronic pain     Chronic pain in both legs    Reports 8-9/10 pain without medication  Reports percocet helps, 3 x day   Legs remains dependent with extensive lymphedema   Chronic inflammation           Medications, treatments, and labs reviewed  Continue medications and treatments as listed in EMR    ELIZABETH Howard      Electronically Signed By: ELIZABETH Howard   24  2:40 PM

## 2024-04-27 PROBLEM — G89.29 OTHER CHRONIC PAIN: Status: ACTIVE | Noted: 2024-04-27

## 2024-04-27 NOTE — ASSESSMENT & PLAN NOTE
Chronic pain in both legs    Reports 8-9/10 pain without medication  Reports percocet helps, 3 x day   Legs remains dependent with extensive lymphedema   Chronic inflammation

## 2024-04-27 NOTE — PROGRESS NOTES
PROGRESS NOTE    Subjective   Chief complaint: Mildred Nolen is a 89 y.o. female who is a long term care patient being seen and evaluated for evaluate pain management     HPI: Reviewed her current pain management order. She is currently on percocet 3 x day. She reports that it reduces the pain in her legs. Reports that her pain remain 8-9/10 without medication. Both legs remain with extensive dependent edema. Wrapped with ace wraps.   HPI      Objective   Vital signs: 128/67-72-18-97/4     Physical Exam  Vitals and nursing note reviewed.   Constitutional:       General: She is not in acute distress.  Eyes:      Extraocular Movements: Extraocular movements intact.   Cardiovascular:      Comments: Remains with extensive lymphedema in both lower legs. Her legs are wrapped with ace wraps.   Pulmonary:      Effort: Pulmonary effort is normal.   Musculoskeletal:      Cervical back: Neck supple.   Neurological:      Mental Status: She is alert.   Psychiatric:         Mood and Affect: Mood normal.         Behavior: Behavior is cooperative.         Assessment/Plan   Problem List Items Addressed This Visit       HTN (hypertension) - Primary     Monitor BP   continue with antihypertensive meds   RENNY diet         COPD (chronic obstructive pulmonary disease) (Multi)     Lungs clear.   No wheezing   Denies any SOB          Other chronic pain     Chronic pain in both legs    Reports 8-9/10 pain without medication  Reports percocet helps, 3 x day   Legs remains dependent with extensive lymphedema   Chronic inflammation           Medications, treatments, and labs reviewed  Continue medications and treatments as listed in EMR    Lauren Cerna, APRN-CNP

## 2024-05-01 ENCOUNTER — NURSING HOME VISIT (OUTPATIENT)
Dept: POST ACUTE CARE | Facility: EXTERNAL LOCATION | Age: 89
End: 2024-05-01
Payer: MEDICARE

## 2024-05-01 DIAGNOSIS — R23.8 SKIN IRRITATION: Primary | ICD-10-CM

## 2024-05-01 DIAGNOSIS — R60.9 EDEMA, UNSPECIFIED TYPE: ICD-10-CM

## 2024-05-01 DIAGNOSIS — J44.9 CHRONIC OBSTRUCTIVE PULMONARY DISEASE, UNSPECIFIED COPD TYPE (MULTI): ICD-10-CM

## 2024-05-01 DIAGNOSIS — I10 PRIMARY HYPERTENSION: ICD-10-CM

## 2024-05-01 PROCEDURE — 99308 SBSQ NF CARE LOW MDM 20: CPT | Performed by: INTERNAL MEDICINE

## 2024-05-01 NOTE — LETTER
Patient: Mildred Nolen  : 1930    Encounter Date: 2024    PROGRESS NOTE    Subjective  Chief complaint: Mildred Nolen is a 89 y.o. female who is a long term care patient being seen and evaluated for follow-up cellulitis.    HPI:  HPI  Patient is seen in follow-up for recent cellulitis to bilateral lower extremities.  Edema has improved.  Patient does continue with Ace wrap's to bilateral lower extremities due to lymphedema.  Patient to elevate as able.  Wound doctor is following.  Patient was noted to have a posterior irritation to right thigh due to wheelchair rubbing.  Patient is getting new wheelchair.  Patient seen and examined at the bedside, appears to be in no acute distress.    Objective  Vital signs: 135/65, 72    Physical Exam  Constitutional:       General: She is not in acute distress.  Eyes:      Extraocular Movements: Extraocular movements intact.   Cardiovascular:      Rate and Rhythm: Regular rhythm.   Pulmonary:      Effort: Pulmonary effort is normal.      Breath sounds: Normal breath sounds.      Comments: O2  Abdominal:      General: Bowel sounds are normal.      Palpations: Abdomen is soft.   Musculoskeletal:      Cervical back: Neck supple.      Right lower leg: Edema present.      Left lower leg: Edema present.      Comments: Ace wrap's to bilateral lower extremities  Bilateral lower extremity erythema, tender to palpation   Neurological:      Mental Status: She is alert.   Psychiatric:         Mood and Affect: Mood normal.         Behavior: Behavior is cooperative.         Assessment/Plan  Problem List Items Addressed This Visit       HTN (hypertension)     Monitor BP   continue with antihypertensive meds   RENNY diet         COPD (chronic obstructive pulmonary disease) (Multi)     Lungs clear.   No wheezing   Denies any SOB          Edema     Chronic, edema improving   continue Lasix         Skin irritation - Primary     Due to wheelchair rubbing, orders placed for new wheelchair           Medications, treatments, and labs reviewed  Continue medications and treatments as listed in EMR    Scribe Attestation  I, Joseph Landis   attest that this documentation has been prepared under the direction and in the presence of Nadir Black MD    Provider Attestation - Scribe documentation  All medical record entries made by the Scribe were at my direction and personally dictated by me. I have reviewed the chart and agree that the record accurately reflects my personal performance of the history, physical exam, discussion and plan.   Nadir Black MD            Electronically Signed By: Nadir Black MD   5/2/24  3:47 PM

## 2024-05-01 NOTE — PROGRESS NOTES
PROGRESS NOTE    Subjective   Chief complaint: Mildred Nolen is a 89 y.o. female who is a long term care patient being seen and evaluated for follow-up cellulitis.    HPI:  HPI  Patient is seen in follow-up for recent cellulitis to bilateral lower extremities.  Edema has improved.  Patient does continue with Ace wrap's to bilateral lower extremities due to lymphedema.  Patient to elevate as able.  Wound doctor is following.  Patient was noted to have a posterior irritation to right thigh due to wheelchair rubbing.  Patient is getting new wheelchair.  Patient seen and examined at the bedside, appears to be in no acute distress.    Objective   Vital signs: 135/65, 72    Physical Exam  Constitutional:       General: She is not in acute distress.  Eyes:      Extraocular Movements: Extraocular movements intact.   Cardiovascular:      Rate and Rhythm: Regular rhythm.   Pulmonary:      Effort: Pulmonary effort is normal.      Breath sounds: Normal breath sounds.      Comments: O2  Abdominal:      General: Bowel sounds are normal.      Palpations: Abdomen is soft.   Musculoskeletal:      Cervical back: Neck supple.      Right lower leg: Edema present.      Left lower leg: Edema present.      Comments: Ace wrap's to bilateral lower extremities  Bilateral lower extremity erythema, tender to palpation   Neurological:      Mental Status: She is alert.   Psychiatric:         Mood and Affect: Mood normal.         Behavior: Behavior is cooperative.         Assessment/Plan   Problem List Items Addressed This Visit       HTN (hypertension)     Monitor BP   continue with antihypertensive meds   RENNY diet         COPD (chronic obstructive pulmonary disease) (Multi)     Lungs clear.   No wheezing   Denies any SOB          Edema     Chronic, edema improving   continue Lasix         Skin irritation - Primary     Due to wheelchair rubbing, orders placed for new wheelchair          Medications, treatments, and labs reviewed  Continue  medications and treatments as listed in EMR    Scribe Attestation  I, Joseph Landis   attest that this documentation has been prepared under the direction and in the presence of Nadir Black MD    Provider Attestation - Scribe documentation  All medical record entries made by the Scribe were at my direction and personally dictated by me. I have reviewed the chart and agree that the record accurately reflects my personal performance of the history, physical exam, discussion and plan.   Nadir Black MD

## 2024-05-03 VITALS
WEIGHT: 195 LBS | DIASTOLIC BLOOD PRESSURE: 72 MMHG | BODY MASS INDEX: 34.55 KG/M2 | SYSTOLIC BLOOD PRESSURE: 146 MMHG | HEIGHT: 63 IN

## 2024-05-03 PROBLEM — Z00.00 ENCOUNTER FOR ANNUAL WELLNESS VISIT (AWV) IN MEDICARE PATIENT: Status: ACTIVE | Noted: 2024-05-03

## 2024-05-03 ASSESSMENT — PATIENT HEALTH QUESTIONNAIRE - PHQ9
SUM OF ALL RESPONSES TO PHQ9 QUESTIONS 1 AND 2: 0
1. LITTLE INTEREST OR PLEASURE IN DOING THINGS: NOT AT ALL
2. FEELING DOWN, DEPRESSED OR HOPELESS: NOT AT ALL

## 2024-05-03 ASSESSMENT — ENCOUNTER SYMPTOMS
LOSS OF SENSATION IN FEET: 1
OCCASIONAL FEELINGS OF UNSTEADINESS: 1
DEPRESSION: 0

## 2024-05-03 ASSESSMENT — ACTIVITIES OF DAILY LIVING (ADL)
DRESSING: NEEDS ASSISTANCE
BATHING: NEEDS ASSISTANCE

## 2024-05-03 NOTE — PROGRESS NOTES
ANNUAL WELLNESS VISIT    Subjective :  Chief Complaint: Milderd Nolen is an 89 y.o.  Non- female who presents for annual wellness visit, general medical care, and follow-up chronic conditions.    HPI:  HPI Patient seen and examined at bedside for annual wellness visit nd physical. Denies constitutional symptoms. No new concerns according to nursing staff. No acute distress.     Past Medical History:   Diagnosis Date    CHF (congestive heart failure) (Multi)     COPD (chronic obstructive pulmonary disease) (Multi)     Diabetes mellitus (Multi)     GERD (gastroesophageal reflux disease)        No past surgical history on file.    Family History   Problem Relation Name Age of Onset    No Known Problems Mother      No Known Problems Father         Social History     Socioeconomic History    Marital status: Unknown     Spouse name: None    Number of children: None    Years of education: None    Highest education level: None   Occupational History    None   Tobacco Use    Smoking status: Never     Passive exposure: Never    Smokeless tobacco: Never   Vaping Use    Vaping status: Never Used   Substance and Sexual Activity    Alcohol use: Never    Drug use: Never    Sexual activity: Not Currently   Other Topics Concern    None   Social History Narrative    None     Social Determinants of Health     Financial Resource Strain: Low Risk  (8/23/2020)    Received from [facility]    Overall Financial Resource Strain (CARDIA)     Difficulty of Paying Living Expenses: Not hard at all   Food Insecurity: No Food Insecurity (8/23/2020)    Received from [facility]    Hunger Vital Sign     Worried About Running Out of Food in the Last Year: Never true     Ran Out of Food in the Last Year: Never true   Transportation Needs: No Transportation Needs (8/23/2020)    Received from [facility]    PRAPARE - Transportation     Lack of Transportation (Medical): No     Lack of Transportation (Non-Medical): No  "  Physical Activity: Not on file   Stress: Not on file   Social Connections: Not on file   Intimate Partner Violence: Not on file   Housing Stability: Not on file       Objective   /72   Ht 1.6 m (5' 3\")   Wt 88.5 kg (195 lb)   BMI 34.54 kg/m²     Physical Exam  Constitutional:       General: She is not in acute distress.     Appearance: She is obese.   Eyes:      Extraocular Movements: Extraocular movements intact.   Pulmonary:      Effort: Pulmonary effort is normal.   Musculoskeletal:      Cervical back: Neck supple.      Right lower leg: Edema present.      Left lower leg: Edema present.   Neurological:      Mental Status: She is alert.   Psychiatric:         Mood and Affect: Mood normal.         Behavior: Behavior is cooperative.         Imaging:  No results found.     Labs reviewed:    Lab Results   Component Value Date    WBC 4.9 05/04/2024    HGB 12.8 05/04/2024    HCT 40.9 05/04/2024     (L) 05/04/2024    ALT 5 (L) 05/04/2024    AST 11 05/04/2024     05/04/2024    K 3.7 05/04/2024     05/04/2024    CREATININE 1.23 (H) 05/04/2024    BUN 16 05/04/2024    CO2 26 05/04/2024    INR 1.2 (H) 11/07/2020       Past Medical, Surgical, and Family History reviewed and updated in chart  Allergies reviewed and updated in facility record  All medications and treatments were reviewed in the local facility record  Nursing assessments/notes were reviewed in the local facility record    List of current healthcare providers:  Patient Care Team:  Nadir Black MD as PCP - General (Internal Medicine)     HRA:  Over the past 2 weeks, how often have you been bothered by any of the following problems?  Little interest or pleasure in doing things: Not at all  Feeling down, depressed, or hopeless: Not at all  Patient Health Questionnaire-2 Score: 0    Steadi Fall Risk  One or more falls in the last year? No  How many Times?    Was the patient injured in the fall?    Has trouble stepping onto curb? " Yes  Advised to use a cane or walker to get around safely? Yes  Often has to rush to toilet? No  Feels unsteady when walking? Yes  Has lost some feeling in feet? Yes  Often feels sad or depressed? No  Steadies self on furniture while walking at home? No  Takes medicine that makes them feel lightheaded or more tired than usual? Yes  Worried about Falling? Yes  Takes medicine to sleep or improve mood? Yes  Needs to push with hands when rising from a chair? Yes            Falls Home Safety Risk Factors  Home Safety Risk Factors: None    Functional Ability/Level of Safety  Cognitive Impairment Observed: No cognitive impairment observed  Cognitive Impairment Reported: No cognitive impairment reported by patient or family    Patient Self Assessment of Health Status  Patient Self Assessment: Fair         ADL Screening  Hearing - Right Ear: Difficulty with noise  Hearing - Left Ear: Difficulty with noise  Bathing: Needs assistance  Dressing: Needs assistance  Walks in Home: Needs assistance         Psychosocial risks:  Do you feel sad - no  Do you feel stressed - no  Do you feel lonely - yes  Do you feel fatigued - no  Do you have any pain - no    Assessment/Plan :  Problem List Items Addressed This Visit       HTN (hypertension)     Monitor BP   continue with antihypertensive meds   RENNY diet         Gastroesophageal reflux disease without esophagitis       Continue Zofran for nausea.  Continue PPI  probiotic  monitor         COPD (chronic obstructive pulmonary disease) (Multi)     Lungs clear.   No wheezing   Denies any SOB          Edema - Primary     Chronic, edema improving   continue Lasix  Compression stocking         Encounter for annual wellness visit (AWV) in Medicare patient     The following health maintenance schedule was reviewed with the patient and provided in printed form in the after visit summary:  Health Maintenance   Topic Date Due    Echocardiogram  05/15/2024 (Originally 6/24/1930)    RSV Pregnant  patients and/or  patients aged 60+ years (1 - 1-dose 60+ series) 05/15/2024 (Originally 6/24/1990)    Lipid Panel  05/15/2024 (Originally 6/24/1930)    Pneumococcal Vaccine: 65+ Years (2 of 2 - PCV) 05/15/2024 (Originally 3/1/2022)    Zoster Vaccines (1 of 2) 05/15/2024 (Originally 6/24/1980)    Bone Density Scan  05/15/2024 (Originally 6/24/1930)    COVID-19 Vaccine (1 - 2023-24 season) 07/31/2024 (Originally 9/1/2023)    Influenza Vaccine (Season Ended) 09/01/2024    Medicare Annual Wellness Visit (AWV)  04/25/2025    Creatinine Level  05/04/2025    Potassium Level  05/04/2025    HIB Vaccines  Aged Out    Hepatitis B Vaccines  Aged Out    IPV Vaccines  Aged Out    Hepatitis A Vaccines  Aged Out    Meningococcal Vaccine  Aged Out    Rotavirus Vaccines  Aged Out    HPV Vaccines  Aged Out    DTaP/Tdap/Td Vaccines  Discontinued     Will obtain the following labs at nursing facility:  BMP     Continue current medications as listed in facility EMR  Time spent 30 minutes (85404)     Scribe Attestation  I, Joseph Steele   attest that this documentation has been prepared under the direction and in the presence of Nadir Blakc MD.    Provider Attestation - Scribe documentation  All medical record entries made by the Scribe were at my direction and personally dictated by me. I have reviewed the chart and agree that the record accurately reflects my personal performance of the history, physical exam, discussion and plan.

## 2024-05-04 ENCOUNTER — LAB REQUISITION (OUTPATIENT)
Dept: LAB | Facility: HOSPITAL | Age: 89
End: 2024-05-04
Payer: MEDICARE

## 2024-05-04 ENCOUNTER — NURSING HOME VISIT (OUTPATIENT)
Dept: POST ACUTE CARE | Facility: EXTERNAL LOCATION | Age: 89
End: 2024-05-04
Payer: MEDICARE

## 2024-05-04 DIAGNOSIS — J44.9 CHRONIC OBSTRUCTIVE PULMONARY DISEASE, UNSPECIFIED COPD TYPE (MULTI): ICD-10-CM

## 2024-05-04 DIAGNOSIS — I10 PRIMARY HYPERTENSION: ICD-10-CM

## 2024-05-04 DIAGNOSIS — R53.1 WEAKNESS: ICD-10-CM

## 2024-05-04 DIAGNOSIS — Z30.09 VISIT FOR FAMILY PLANNING EDUCATION: ICD-10-CM

## 2024-05-04 LAB
ALBUMIN SERPL BCP-MCNC: 3.1 G/DL (ref 3.4–5)
ALP SERPL-CCNC: 87 U/L (ref 33–136)
ALT SERPL W P-5'-P-CCNC: 5 U/L (ref 7–45)
ANION GAP SERPL CALC-SCNC: 15 MMOL/L (ref 10–20)
AST SERPL W P-5'-P-CCNC: 11 U/L (ref 9–39)
BASOPHILS # BLD AUTO: 0.02 X10*3/UL (ref 0–0.1)
BASOPHILS NFR BLD AUTO: 0.4 %
BILIRUB SERPL-MCNC: 0.6 MG/DL (ref 0–1.2)
BUN SERPL-MCNC: 16 MG/DL (ref 6–23)
CALCIUM SERPL-MCNC: 8.2 MG/DL (ref 8.6–10.3)
CHLORIDE SERPL-SCNC: 104 MMOL/L (ref 98–107)
CO2 SERPL-SCNC: 26 MMOL/L (ref 21–32)
CREAT SERPL-MCNC: 1.23 MG/DL (ref 0.5–1.05)
EGFRCR SERPLBLD CKD-EPI 2021: 41 ML/MIN/1.73M*2
EOSINOPHIL # BLD AUTO: 0.23 X10*3/UL (ref 0–0.4)
EOSINOPHIL NFR BLD AUTO: 4.7 %
ERYTHROCYTE [DISTWIDTH] IN BLOOD BY AUTOMATED COUNT: 14.3 % (ref 11.5–14.5)
GLUCOSE SERPL-MCNC: 62 MG/DL (ref 74–99)
HCT VFR BLD AUTO: 40.9 % (ref 36–46)
HGB BLD-MCNC: 12.8 G/DL (ref 12–16)
IMM GRANULOCYTES # BLD AUTO: 0.01 X10*3/UL (ref 0–0.5)
IMM GRANULOCYTES NFR BLD AUTO: 0.2 % (ref 0–0.9)
LYMPHOCYTES # BLD AUTO: 1.72 X10*3/UL (ref 0.8–3)
LYMPHOCYTES NFR BLD AUTO: 34.8 %
MCH RBC QN AUTO: 30.3 PG (ref 26–34)
MCHC RBC AUTO-ENTMCNC: 31.3 G/DL (ref 32–36)
MCV RBC AUTO: 97 FL (ref 80–100)
MONOCYTES # BLD AUTO: 0.48 X10*3/UL (ref 0.05–0.8)
MONOCYTES NFR BLD AUTO: 9.7 %
NEUTROPHILS # BLD AUTO: 2.48 X10*3/UL (ref 1.6–5.5)
NEUTROPHILS NFR BLD AUTO: 50.2 %
NRBC BLD-RTO: 0 /100 WBCS (ref 0–0)
PLATELET # BLD AUTO: 145 X10*3/UL (ref 150–450)
POTASSIUM SERPL-SCNC: 3.7 MMOL/L (ref 3.5–5.3)
PROT SERPL-MCNC: 4.7 G/DL (ref 6.4–8.2)
RBC # BLD AUTO: 4.22 X10*6/UL (ref 4–5.2)
SODIUM SERPL-SCNC: 141 MMOL/L (ref 136–145)
WBC # BLD AUTO: 4.9 X10*3/UL (ref 4.4–11.3)

## 2024-05-04 PROCEDURE — 80053 COMPREHEN METABOLIC PANEL: CPT

## 2024-05-04 PROCEDURE — 99309 SBSQ NF CARE MODERATE MDM 30: CPT | Performed by: INTERNAL MEDICINE

## 2024-05-04 PROCEDURE — 85025 COMPLETE CBC W/AUTO DIFF WBC: CPT

## 2024-05-04 NOTE — LETTER
Patient: Mildred Nolen  : 1930    Encounter Date: 2024    PROGRESS NOTE    Subjective  Chief complaint: Mildred Nolen is a 89 y.o. female who is a long term care patient being seen and evaluated for follow up decline.     HPI:  Nursing called 5/3/24 to report patient experience increase in lethargy and appeared to have stroke like symptoms but then returned to baseline. Family and patient refused to go to ED for evaluation. Ordered nursing to monitor for further symptoms. Followed up with patient and staff and patient appeared at baseline, STAT labs ordered. However nursing reported that after visit, patient continued with symptoms of weakness and loss of extremity use, patient and family agreed to ED visit.       Objective  Vital signs: 121/67,60,98%    Physical Exam  Constitutional:       General: She is not in acute distress.  Eyes:      Extraocular Movements: Extraocular movements intact.   Cardiovascular:      Rate and Rhythm: Regular rhythm.   Pulmonary:      Effort: Pulmonary effort is normal.      Breath sounds: Normal breath sounds.      Comments: O2  Abdominal:      General: Bowel sounds are normal.      Palpations: Abdomen is soft.   Musculoskeletal:      Cervical back: Neck supple.      Right lower leg: Edema present.      Left lower leg: Edema present.      Comments: Ace wrap's to bilateral lower extremities  Bilateral lower extremity erythema, tender to palpation   Neurological:      Mental Status: She is alert.   Psychiatric:         Mood and Affect: Mood normal.         Behavior: Behavior is cooperative.         Assessment/Plan  Problem List Items Addressed This Visit       HTN (hypertension)     Monitor BP   continue with antihypertensive meds   RENNY diet         COPD (chronic obstructive pulmonary disease) (Multi)     Lungs clear.   No wheezing   Denies any SOB          Visit for family planning education     Evaluate for possible stroke           Medications, treatments, and labs  reviewed  Continue medications and treatments as listed in EMR      Scribe Attestation  I, Joseph Meza   attest that this documentation has been prepared under the direction and in the presence of Nadir Black MD.     Provider Attestation - Scribe documentation  All medical record entries made by the Scribe were at my direction and personally dictated by me. I have reviewed the chart and agree that the record accurately reflects my personal performance of the history, physical exam, discussion and plan.   Nadir Black MD      Electronically Signed By: Nadir Black MD   5/7/24  4:11 PM

## 2024-05-07 PROBLEM — Z30.09: Status: ACTIVE | Noted: 2024-05-07

## 2024-05-07 NOTE — PROGRESS NOTES
PROGRESS NOTE    Subjective   Chief complaint: Mildred Nolen is a 89 y.o. female who is a long term care patient being seen and evaluated for follow up decline.     HPI:  Nursing called 5/3/24 to report patient experience increase in lethargy and appeared to have stroke like symptoms but then returned to baseline. Family and patient refused to go to ED for evaluation. Ordered nursing to monitor for further symptoms. Followed up with patient and staff and patient appeared at baseline, STAT labs ordered. However nursing reported that after visit, patient continued with symptoms of weakness and loss of extremity use, patient and family agreed to ED visit.       Objective   Vital signs: 121/67,60,98%    Physical Exam  Constitutional:       General: She is not in acute distress.  Eyes:      Extraocular Movements: Extraocular movements intact.   Cardiovascular:      Rate and Rhythm: Regular rhythm.   Pulmonary:      Effort: Pulmonary effort is normal.      Breath sounds: Normal breath sounds.      Comments: O2  Abdominal:      General: Bowel sounds are normal.      Palpations: Abdomen is soft.   Musculoskeletal:      Cervical back: Neck supple.      Right lower leg: Edema present.      Left lower leg: Edema present.      Comments: Ace wrap's to bilateral lower extremities  Bilateral lower extremity erythema, tender to palpation   Neurological:      Mental Status: She is alert.   Psychiatric:         Mood and Affect: Mood normal.         Behavior: Behavior is cooperative.         Assessment/Plan   Problem List Items Addressed This Visit       HTN (hypertension)     Monitor BP   continue with antihypertensive meds   RENNY diet         COPD (chronic obstructive pulmonary disease) (Multi)     Lungs clear.   No wheezing   Denies any SOB          Visit for family planning education     Evaluate for possible stroke           Medications, treatments, and labs reviewed  Continue medications and treatments as listed in EMR      Scribe  Attestation  I, Joseph Meza   attest that this documentation has been prepared under the direction and in the presence of Nadir Black MD.     Provider Attestation - Scribe documentation  All medical record entries made by the Scribe were at my direction and personally dictated by me. I have reviewed the chart and agree that the record accurately reflects my personal performance of the history, physical exam, discussion and plan.   Nadir Black MD

## 2024-05-10 ENCOUNTER — NURSING HOME VISIT (OUTPATIENT)
Dept: POST ACUTE CARE | Facility: EXTERNAL LOCATION | Age: 89
End: 2024-05-10
Payer: MEDICARE

## 2024-05-10 DIAGNOSIS — I63.9 CEREBROVASCULAR ACCIDENT (CVA), UNSPECIFIED MECHANISM (MULTI): Primary | ICD-10-CM

## 2024-05-10 DIAGNOSIS — I11.0 HYPERTENSIVE HEART DISEASE WITH HEART FAILURE (MULTI): ICD-10-CM

## 2024-05-10 DIAGNOSIS — I48.91 ATRIAL FIBRILLATION, UNSPECIFIED TYPE (MULTI): ICD-10-CM

## 2024-05-10 DIAGNOSIS — J44.9 CHRONIC OBSTRUCTIVE PULMONARY DISEASE, UNSPECIFIED COPD TYPE (MULTI): ICD-10-CM

## 2024-05-10 DIAGNOSIS — K21.9 GASTROESOPHAGEAL REFLUX DISEASE WITHOUT ESOPHAGITIS: ICD-10-CM

## 2024-05-10 PROCEDURE — 99305 1ST NF CARE MODERATE MDM 35: CPT | Performed by: INTERNAL MEDICINE

## 2024-05-10 NOTE — ASSESSMENT & PLAN NOTE
Monitor blood pressure  Diltiazem  Metoprolol  RENNY diet   CHF, monitor for shortness of breath.  Furosemide:

## 2024-05-10 NOTE — LETTER
Patient: Mildred Nolen  : 1930    Encounter Date: 05/10/2024    HISTORY & PHYSICAL    Subjective  Chief complaint: Mildred Nolen is a 89 y.o. female who is being seen and evaluated for multiple medical problems.  Patient admitted after recent hospitalization.    HPI:  HPI  Patient presented to hospital for potential stroke.  Patient not a candidate for thrombectomy.  Patient to continue on aspirin and statin.  Neurology did follow patient.  No further interventions.  PT OT evaluated patient.  Patient was hemodynamically stable to discharge back to nursing facility for continued medical management.  Patient was seen and examined at the bedside, appears to be in no acute distress.  Patient denies chest pain or shortness of breath.  Denies nausea or vomiting.  Denies fever or chills.    Past Medical History:   Diagnosis Date   • Anemia    • Anxiety    • Atrial fibrillation (Multi)    • CHF (congestive heart failure) (Multi)    • COPD (chronic obstructive pulmonary disease) (Multi)    • Depression    • Diabetes mellitus (Multi)    • GERD (gastroesophageal reflux disease)    • Osteoarthritis        No past surgical history on file.    Family History   Problem Relation Name Age of Onset   • No Known Problems Mother     • No Known Problems Father         Social History     Socioeconomic History   • Marital status: Unknown     Spouse name: Not on file   • Number of children: Not on file   • Years of education: Not on file   • Highest education level: Not on file   Occupational History   • Not on file   Tobacco Use   • Smoking status: Never     Passive exposure: Never   • Smokeless tobacco: Never   Vaping Use   • Vaping status: Never Used   Substance and Sexual Activity   • Alcohol use: Never   • Drug use: Never   • Sexual activity: Not Currently   Other Topics Concern   • Not on file   Social History Narrative   • Not on file     Social Determinants of Health     Financial Resource Strain: Low Risk  (2020)     Received from [facility]    Overall Financial Resource Strain (CARDIA)    • Difficulty of Paying Living Expenses: Not hard at all   Food Insecurity: No Food Insecurity (5/6/2024)    Received from [facility]    Hunger Vital Sign    • Worried About Running Out of Food in the Last Year: Never true    • Ran Out of Food in the Last Year: Never true   Transportation Needs: No Transportation Needs (5/6/2024)    Received from [facility]    PRAPARE - Transportation    • Lack of Transportation (Medical): No    • Lack of Transportation (Non-Medical): No   Physical Activity: Not on file   Stress: Not on file   Social Connections: Not on file   Intimate Partner Violence: Not on file   Housing Stability: Unknown (5/6/2024)    Received from [facility]    Housing Stability Vital Sign    • Unable to Pay for Housing in the Last Year: No    • Number of Places Lived in the Last Year: Not on file    • Unstable Housing in the Last Year: No       Vital signs: 128/66, 60, 20, 98.0, 96%    Objective  Physical Exam  Constitutional:       General: She is not in acute distress.     Appearance: She is obese.   Eyes:      Extraocular Movements: Extraocular movements intact.   Pulmonary:      Effort: Pulmonary effort is normal.   Musculoskeletal:      Cervical back: Neck supple.      Right lower leg: Edema present.      Left lower leg: Edema present.   Neurological:      Mental Status: She is alert.   Psychiatric:         Mood and Affect: Mood normal.         Behavior: Behavior is cooperative.         Assessment/Plan  Problem List Items Addressed This Visit       Hypertensive heart disease with heart failure (Multi)     Monitor blood pressure  Diltiazem  Metoprolol  RENNY diet   CHF, monitor for shortness of breath.  Furosemide:          Gastroesophageal reflux disease without esophagitis     PPI  Monitor for increased GI symptoms         COPD (chronic obstructive pulmonary disease) (Multi)     O2  Monitor for shortness of  breath         CVA (cerebral vascular accident) (Multi) - Primary     Statin  Not a candidate for thrombectomy         A-fib (Multi)     Anticoagulant  Bleeding precautions  Monitor heart rate          Hospital records reviewed  Medications, treatments, and labs reviewed  Continue medications and treatments as listed in EMR  Discussed with nursing and therapy      Scribe Attestation  Ana CHRISTIANSON Scribe   attest that this documentation has been prepared under the direction and in the presence of Nadir Black MD    Provider Attestation - Scribe documentation  All medical record entries made by the Scribe were at my direction and personally dictated by me. I have reviewed the chart and agree that the record accurately reflects my personal performance of the history, physical exam, discussion and plan.   Nadir Black MD      Electronically Signed By: Nadir Black MD   5/10/24  3:40 PM

## 2024-05-10 NOTE — PROGRESS NOTES
HISTORY & PHYSICAL    Subjective   Chief complaint: Mildred Nolen is a 89 y.o. female who is being seen and evaluated for multiple medical problems.  Patient admitted after recent hospitalization.    HPI:  HPI  Patient presented to hospital for potential stroke.  Patient not a candidate for thrombectomy.  Patient to continue on aspirin and statin.  Neurology did follow patient.  No further interventions.  PT OT evaluated patient.  Patient was hemodynamically stable to discharge back to nursing facility for continued medical management.  Patient was seen and examined at the bedside, appears to be in no acute distress.  Patient denies chest pain or shortness of breath.  Denies nausea or vomiting.  Denies fever or chills.    Past Medical History:   Diagnosis Date    Anemia     Anxiety     Atrial fibrillation (Multi)     CHF (congestive heart failure) (Multi)     COPD (chronic obstructive pulmonary disease) (Multi)     Depression     Diabetes mellitus (Multi)     GERD (gastroesophageal reflux disease)     Osteoarthritis        No past surgical history on file.    Family History   Problem Relation Name Age of Onset    No Known Problems Mother      No Known Problems Father         Social History     Socioeconomic History    Marital status: Unknown     Spouse name: Not on file    Number of children: Not on file    Years of education: Not on file    Highest education level: Not on file   Occupational History    Not on file   Tobacco Use    Smoking status: Never     Passive exposure: Never    Smokeless tobacco: Never   Vaping Use    Vaping status: Never Used   Substance and Sexual Activity    Alcohol use: Never    Drug use: Never    Sexual activity: Not Currently   Other Topics Concern    Not on file   Social History Narrative    Not on file     Social Determinants of Health     Financial Resource Strain: Low Risk  (8/23/2020)    Received from [facility]    Overall Financial Resource Strain (CARDIA)     Difficulty of  Paying Living Expenses: Not hard at all   Food Insecurity: No Food Insecurity (5/6/2024)    Received from [facility]    Hunger Vital Sign     Worried About Running Out of Food in the Last Year: Never true     Ran Out of Food in the Last Year: Never true   Transportation Needs: No Transportation Needs (5/6/2024)    Received from [facility]    PRAPARE - Transportation     Lack of Transportation (Medical): No     Lack of Transportation (Non-Medical): No   Physical Activity: Not on file   Stress: Not on file   Social Connections: Not on file   Intimate Partner Violence: Not on file   Housing Stability: Unknown (5/6/2024)    Received from [facility]    Housing Stability Vital Sign     Unable to Pay for Housing in the Last Year: No     Number of Places Lived in the Last Year: Not on file     Unstable Housing in the Last Year: No       Vital signs: 128/66, 60, 20, 98.0, 96%    Objective   Physical Exam  Constitutional:       General: She is not in acute distress.     Appearance: She is obese.   Eyes:      Extraocular Movements: Extraocular movements intact.   Pulmonary:      Effort: Pulmonary effort is normal.   Musculoskeletal:      Cervical back: Neck supple.      Right lower leg: Edema present.      Left lower leg: Edema present.   Neurological:      Mental Status: She is alert.   Psychiatric:         Mood and Affect: Mood normal.         Behavior: Behavior is cooperative.         Assessment/Plan   Problem List Items Addressed This Visit       Hypertensive heart disease with heart failure (Multi)     Monitor blood pressure  Diltiazem  Metoprolol  RENNY diet   CHF, monitor for shortness of breath.  Furosemide:          Gastroesophageal reflux disease without esophagitis     PPI  Monitor for increased GI symptoms         COPD (chronic obstructive pulmonary disease) (Multi)     O2  Monitor for shortness of breath         CVA (cerebral vascular accident) (Multi) - Primary     Statin  Not a candidate for  thrombectomy         A-fib (Multi)     Anticoagulant  Bleeding precautions  Monitor heart rate          Hospital records reviewed  Medications, treatments, and labs reviewed  Continue medications and treatments as listed in EMR  Discussed with nursing and therapy      Scribe Attestation  I, Joseph Landis   attest that this documentation has been prepared under the direction and in the presence of Nadir Black MD    Provider Attestation - Scribe documentation  All medical record entries made by the Scribe were at my direction and personally dictated by me. I have reviewed the chart and agree that the record accurately reflects my personal performance of the history, physical exam, discussion and plan.   Nadir Black MD

## 2024-05-11 ENCOUNTER — NURSING HOME VISIT (OUTPATIENT)
Dept: POST ACUTE CARE | Facility: EXTERNAL LOCATION | Age: 89
End: 2024-05-11
Payer: MEDICARE

## 2024-05-11 DIAGNOSIS — J44.9 CHRONIC OBSTRUCTIVE PULMONARY DISEASE, UNSPECIFIED COPD TYPE (MULTI): ICD-10-CM

## 2024-05-11 DIAGNOSIS — I11.0 HYPERTENSIVE HEART DISEASE WITH HEART FAILURE (MULTI): ICD-10-CM

## 2024-05-11 DIAGNOSIS — K21.9 GASTROESOPHAGEAL REFLUX DISEASE WITHOUT ESOPHAGITIS: ICD-10-CM

## 2024-05-11 DIAGNOSIS — I63.9 CEREBROVASCULAR ACCIDENT (CVA), UNSPECIFIED MECHANISM (MULTI): ICD-10-CM

## 2024-05-11 DIAGNOSIS — I48.91 ATRIAL FIBRILLATION, UNSPECIFIED TYPE (MULTI): ICD-10-CM

## 2024-05-11 PROCEDURE — 99309 SBSQ NF CARE MODERATE MDM 30: CPT | Performed by: INTERNAL MEDICINE

## 2024-05-11 NOTE — LETTER
Patient: Mildred Nolen  : 1930    Encounter Date: 2024    PROGRESS NOTE    Subjective  Chief complaint: Mildred Nolen is a 89 y.o. female who is a long term care patient being seen and evaluated for monthly general medical care and follow-up    HPI:  Patient presents for general medical care and f/u.  Patient seen and examined at bedside. Patient has diagnosis of COPD, denies sob, wheezing, cough. HTN, denies fatigue, sob, and palpitations. Patient has diagnosis of GERD, denies heartburn, regurgitation, epigastric discomfort, sour taste, and cough. Pmhx CVA, denies changes in weakness and speech. CHF, no changes in weight or SOB. Afib, denies palpitation or chest pain.  No issues per nursing.  Patient has no acute complaints.      Objective  Vital signs: 140/68,72,98%    Physical Exam  Constitutional:       General: She is not in acute distress.     Appearance: She is obese.   Eyes:      Extraocular Movements: Extraocular movements intact.   Pulmonary:      Effort: Pulmonary effort is normal.   Musculoskeletal:      Cervical back: Neck supple.      Right lower leg: Edema present.      Left lower leg: Edema present.   Neurological:      Mental Status: She is alert.   Psychiatric:         Mood and Affect: Mood normal.         Behavior: Behavior is cooperative.         Assessment/Plan  Problem List Items Addressed This Visit       Hypertensive heart disease with heart failure (Multi)     Monitor blood pressure  Diltiazem  Metoprolol  RENNY diet   CHF, monitor for shortness of breath.  Furosemide         Gastroesophageal reflux disease without esophagitis     PPI  Monitor for increased GI symptoms         COPD (chronic obstructive pulmonary disease) (Multi)     O2  Monitor for shortness of breath         CVA (cerebral vascular accident) (Multi)     Statin  Monitor labs         A-fib (Multi)     Eliquis  Bleeding precautions  Monitor heart rate          Medications, treatments, and labs reviewed  Continue  medications and treatments as listed in EMR      Scribe Attestation  Rosaura CHRISTIANSON Scribe   attest that this documentation has been prepared under the direction and in the presence of Nadir Black MD.     Provider Attestation - Scribe documentation  All medical record entries made by the Scribe were at my direction and personally dictated by me. I have reviewed the chart and agree that the record accurately reflects my personal performance of the history, physical exam, discussion and plan.   Nadir Black MD      Electronically Signed By: Nadir Black MD   5/13/24  1:20 PM

## 2024-05-13 NOTE — ASSESSMENT & PLAN NOTE
Monitor blood pressure  Diltiazem  Metoprolol  RENNY diet   CHF, monitor for shortness of breath.  Furosemide

## 2024-05-13 NOTE — PROGRESS NOTES
PROGRESS NOTE    Subjective   Chief complaint: Mildred Nolen is a 89 y.o. female who is a long term care patient being seen and evaluated for monthly general medical care and follow-up    HPI:  Patient presents for general medical care and f/u.  Patient seen and examined at bedside. Patient has diagnosis of COPD, denies sob, wheezing, cough. HTN, denies fatigue, sob, and palpitations. Patient has diagnosis of GERD, denies heartburn, regurgitation, epigastric discomfort, sour taste, and cough. Pmhx CVA, denies changes in weakness and speech. CHF, no changes in weight or SOB. Afib, denies palpitation or chest pain.  No issues per nursing.  Patient has no acute complaints.      Objective   Vital signs: 140/68,72,98%    Physical Exam  Constitutional:       General: She is not in acute distress.     Appearance: She is obese.   Eyes:      Extraocular Movements: Extraocular movements intact.   Pulmonary:      Effort: Pulmonary effort is normal.   Musculoskeletal:      Cervical back: Neck supple.      Right lower leg: Edema present.      Left lower leg: Edema present.   Neurological:      Mental Status: She is alert.   Psychiatric:         Mood and Affect: Mood normal.         Behavior: Behavior is cooperative.         Assessment/Plan   Problem List Items Addressed This Visit       Hypertensive heart disease with heart failure (Multi)     Monitor blood pressure  Diltiazem  Metoprolol  RENNY diet   CHF, monitor for shortness of breath.  Furosemide         Gastroesophageal reflux disease without esophagitis     PPI  Monitor for increased GI symptoms         COPD (chronic obstructive pulmonary disease) (Multi)     O2  Monitor for shortness of breath         CVA (cerebral vascular accident) (Multi)     Statin  Monitor labs         A-fib (Multi)     Eliquis  Bleeding precautions  Monitor heart rate          Medications, treatments, and labs reviewed  Continue medications and treatments as listed in EMR      Scribe Attestation  I,  Darlyn Mezaibe   attest that this documentation has been prepared under the direction and in the presence of Nadir Black MD.     Provider Attestation - Scribe documentation  All medical record entries made by the Scribe were at my direction and personally dictated by me. I have reviewed the chart and agree that the record accurately reflects my personal performance of the history, physical exam, discussion and plan.   Nadir Black MD

## 2024-05-15 ENCOUNTER — NURSING HOME VISIT (OUTPATIENT)
Dept: POST ACUTE CARE | Facility: EXTERNAL LOCATION | Age: 89
End: 2024-05-15
Payer: MEDICARE

## 2024-05-15 DIAGNOSIS — G89.29 OTHER CHRONIC PAIN: Primary | ICD-10-CM

## 2024-05-15 DIAGNOSIS — J44.9 CHRONIC OBSTRUCTIVE PULMONARY DISEASE, UNSPECIFIED COPD TYPE (MULTI): ICD-10-CM

## 2024-05-15 DIAGNOSIS — I11.0 HYPERTENSIVE HEART DISEASE WITH HEART FAILURE (MULTI): ICD-10-CM

## 2024-05-15 PROCEDURE — 99308 SBSQ NF CARE LOW MDM 20: CPT | Performed by: INTERNAL MEDICINE

## 2024-05-15 NOTE — LETTER
Patient: Mildred Nolen  : 1930    Encounter Date: 05/15/2024    PROGRESS NOTE    Subjective  Chief complaint: Mildred Nolen is a 89 y.o. female who is a long term care patient being seen and evaluated for pain f/u.    HPI:  HPI  Patient is seen in follow-up for pain.  At this time, patient's pain is well-controlled, restarted Percocet 3 times daily.  Patient in no distress or complaints at this time.  Hospice consult is still pending.  Patient seen and examined at the bedside, appears to be in no acute distress.    Objective  Vital signs: 140/68, 72, 18, 97.6, 98%    Physical Exam  Constitutional:       General: She is not in acute distress.     Appearance: She is obese.   Eyes:      Extraocular Movements: Extraocular movements intact.   Pulmonary:      Effort: Pulmonary effort is normal.   Musculoskeletal:      Cervical back: Neck supple.      Right lower leg: Edema present.      Left lower leg: Edema present.   Neurological:      Mental Status: She is alert.   Psychiatric:         Mood and Affect: Mood normal.         Behavior: Behavior is cooperative.         Assessment/Plan  Problem List Items Addressed This Visit       Hypertensive heart disease with heart failure (Multi)     Monitor blood pressure  Diltiazem  Metoprolol  RENNY diet   CHF, monitor for shortness of breath.  Furosemide:          COPD (chronic obstructive pulmonary disease) (Multi)     O2  Monitor for shortness of breath         Other chronic pain - Primary     Restarted Percocet 3 times daily  Continue to monitor          Medications, treatments, and labs reviewed  Continue medications and treatments as listed in EMR    Scribe Attestation  I, Joseph Landis   attest that this documentation has been prepared under the direction and in the presence of Nadir Black MD    Provider Attestation - Scribe documentation  All medical record entries made by the Scribe were at my direction and personally dictated by me. I have reviewed the  chart and agree that the record accurately reflects my personal performance of the history, physical exam, discussion and plan.   Nadir Black MD            Electronically Signed By: Nadir Black MD   5/15/24  3:21 PM

## 2024-05-15 NOTE — PROGRESS NOTES
PROGRESS NOTE    Subjective   Chief complaint: Mildred Nolen is a 89 y.o. female who is a long term care patient being seen and evaluated for pain f/u.    HPI:  HPI  Patient is seen in follow-up for pain.  At this time, patient's pain is well-controlled, restarted Percocet 3 times daily.  Patient in no distress or complaints at this time.  Hospice consult is still pending.  Patient seen and examined at the bedside, appears to be in no acute distress.    Objective   Vital signs: 140/68, 72, 18, 97.6, 98%    Physical Exam  Constitutional:       General: She is not in acute distress.     Appearance: She is obese.   Eyes:      Extraocular Movements: Extraocular movements intact.   Pulmonary:      Effort: Pulmonary effort is normal.   Musculoskeletal:      Cervical back: Neck supple.      Right lower leg: Edema present.      Left lower leg: Edema present.   Neurological:      Mental Status: She is alert.   Psychiatric:         Mood and Affect: Mood normal.         Behavior: Behavior is cooperative.         Assessment/Plan   Problem List Items Addressed This Visit       Hypertensive heart disease with heart failure (Multi)     Monitor blood pressure  Diltiazem  Metoprolol  RENNY diet   CHF, monitor for shortness of breath.  Furosemide:          COPD (chronic obstructive pulmonary disease) (Multi)     O2  Monitor for shortness of breath         Other chronic pain - Primary     Restarted Percocet 3 times daily  Continue to monitor          Medications, treatments, and labs reviewed  Continue medications and treatments as listed in EMR    Scribe Attestation  MEGHAN, Joseph Landis   attest that this documentation has been prepared under the direction and in the presence of Nadir Black MD    Provider Attestation - Scribe documentation  All medical record entries made by the Scribe were at my direction and personally dictated by me. I have reviewed the chart and agree that the record accurately reflects my personal  performance of the history, physical exam, discussion and plan.   Nadir Black MD

## 2024-05-17 ENCOUNTER — NURSING HOME VISIT (OUTPATIENT)
Dept: POST ACUTE CARE | Facility: EXTERNAL LOCATION | Age: 89
End: 2024-05-17
Payer: MEDICARE

## 2024-05-17 DIAGNOSIS — I63.9 CEREBROVASCULAR ACCIDENT (CVA), UNSPECIFIED MECHANISM (MULTI): ICD-10-CM

## 2024-05-17 DIAGNOSIS — J44.9 CHRONIC OBSTRUCTIVE PULMONARY DISEASE, UNSPECIFIED COPD TYPE (MULTI): ICD-10-CM

## 2024-05-17 DIAGNOSIS — I11.0 HYPERTENSIVE HEART DISEASE WITH HEART FAILURE (MULTI): Primary | ICD-10-CM

## 2024-05-17 PROCEDURE — 99308 SBSQ NF CARE LOW MDM 20: CPT | Performed by: INTERNAL MEDICINE

## 2024-05-17 NOTE — PROGRESS NOTES
PROGRESS NOTE    Subjective   Chief complaint: Mildred Nolen is a 89 y.o. female who is a long term care patient being seen and evaluated for follow-up.    HPI:  HPI  Patient does continue on palliative care.  Recommendations to evaluate medications for adjustments or for continued use.  Patient denies constitutional symptoms.    Objective   Vital signs: 132/66, 76, 18, 98.0, 98%.    Physical Exam  Constitutional:       General: She is not in acute distress.     Appearance: She is obese.   Eyes:      Extraocular Movements: Extraocular movements intact.   Pulmonary:      Effort: Pulmonary effort is normal.   Musculoskeletal:      Cervical back: Neck supple.      Right lower leg: Edema present.      Left lower leg: Edema present.   Neurological:      Mental Status: She is alert.   Psychiatric:         Mood and Affect: Mood normal.         Behavior: Behavior is cooperative.         Assessment/Plan   Problem List Items Addressed This Visit       Hypertensive heart disease with heart failure (Multi) - Primary     Monitor blood pressure  Diltiazem  Metoprolol  RENNY diet   CHF, monitor for shortness of breath.  Furosemide         COPD (chronic obstructive pulmonary disease) (Multi)     O2  Monitor for shortness of breath         CVA (cerebral vascular accident) (Multi)     Statin  Not a candidate for thrombectomy          Medications, treatments, and labs reviewed  Continue medications and treatments as listed in EMR    Scribe Attestation  I, Ana Grimm Scribe   attest that this documentation has been prepared under the direction and in the presence of Nadir Black MD    Provider Attestation - Scribe documentation  All medical record entries made by the Scribe were at my direction and personally dictated by me. I have reviewed the chart and agree that the record accurately reflects my personal performance of the history, physical exam, discussion and plan.   Nadir Black MD

## 2024-05-17 NOTE — LETTER
Patient: Mildred Nolen  : 1930    Encounter Date: 2024    PROGRESS NOTE    Subjective  Chief complaint: Mildred Nolen is a 89 y.o. female who is a long term care patient being seen and evaluated for follow-up.    HPI:  HPI  Patient does continue on palliative care.  Recommendations to evaluate medications for adjustments or for continued use.  Patient denies constitutional symptoms.    Objective  Vital signs: 132/66, 76, 18, 98.0, 98%.    Physical Exam  Constitutional:       General: She is not in acute distress.     Appearance: She is obese.   Eyes:      Extraocular Movements: Extraocular movements intact.   Pulmonary:      Effort: Pulmonary effort is normal.   Musculoskeletal:      Cervical back: Neck supple.      Right lower leg: Edema present.      Left lower leg: Edema present.   Neurological:      Mental Status: She is alert.   Psychiatric:         Mood and Affect: Mood normal.         Behavior: Behavior is cooperative.         Assessment/Plan  Problem List Items Addressed This Visit       Hypertensive heart disease with heart failure (Multi) - Primary     Monitor blood pressure  Diltiazem  Metoprolol  RENNY diet   CHF, monitor for shortness of breath.  Furosemide         COPD (chronic obstructive pulmonary disease) (Multi)     O2  Monitor for shortness of breath         CVA (cerebral vascular accident) (Multi)     Statin  Not a candidate for thrombectomy          Medications, treatments, and labs reviewed  Continue medications and treatments as listed in EMR    Scribe Attestation  I, Joseph Landis   attest that this documentation has been prepared under the direction and in the presence of Nadir Black MD    Provider Attestation - Scribe documentation  All medical record entries made by the Scribe were at my direction and personally dictated by me. I have reviewed the chart and agree that the record accurately reflects my personal performance of the history, physical exam, discussion  and plan.   Nadir Black MD            Electronically Signed By: Nadir Black MD   5/17/24  5:44 PM

## 2024-05-18 ENCOUNTER — LAB REQUISITION (OUTPATIENT)
Dept: LAB | Facility: HOSPITAL | Age: 89
End: 2024-05-18
Payer: MEDICARE

## 2024-05-18 DIAGNOSIS — R79.89 OTHER SPECIFIED ABNORMAL FINDINGS OF BLOOD CHEMISTRY: ICD-10-CM

## 2024-05-18 LAB
ERYTHROCYTE [DISTWIDTH] IN BLOOD BY AUTOMATED COUNT: 13.4 % (ref 11.5–14.5)
HCT VFR BLD AUTO: 40.5 % (ref 36–46)
HGB BLD-MCNC: 13 G/DL (ref 12–16)
MCH RBC QN AUTO: 30 PG (ref 26–34)
MCHC RBC AUTO-ENTMCNC: 32.1 G/DL (ref 32–36)
MCV RBC AUTO: 93 FL (ref 80–100)
NRBC BLD-RTO: 0 /100 WBCS (ref 0–0)
PLATELET # BLD AUTO: 286 X10*3/UL (ref 150–450)
RBC # BLD AUTO: 4.34 X10*6/UL (ref 4–5.2)
WBC # BLD AUTO: 7.3 X10*3/UL (ref 4.4–11.3)

## 2024-05-18 PROCEDURE — 85027 COMPLETE CBC AUTOMATED: CPT

## 2024-05-23 ENCOUNTER — NURSING HOME VISIT (OUTPATIENT)
Dept: POST ACUTE CARE | Facility: EXTERNAL LOCATION | Age: 89
End: 2024-05-23
Payer: MEDICARE

## 2024-05-23 DIAGNOSIS — J44.9 CHRONIC OBSTRUCTIVE PULMONARY DISEASE, UNSPECIFIED COPD TYPE (MULTI): ICD-10-CM

## 2024-05-23 DIAGNOSIS — G89.29 OTHER CHRONIC PAIN: ICD-10-CM

## 2024-05-23 DIAGNOSIS — I48.91 ATRIAL FIBRILLATION, UNSPECIFIED TYPE (MULTI): ICD-10-CM

## 2024-05-23 DIAGNOSIS — I11.0 HYPERTENSIVE HEART DISEASE WITH HEART FAILURE (MULTI): Primary | ICD-10-CM

## 2024-05-23 DIAGNOSIS — R60.9 EDEMA, UNSPECIFIED TYPE: ICD-10-CM

## 2024-05-23 PROCEDURE — 99309 SBSQ NF CARE MODERATE MDM 30: CPT | Performed by: NURSE PRACTITIONER

## 2024-05-23 NOTE — LETTER
Patient: Mildred Nolen  : 1930    Encounter Date: 2024    PROGRESS NOTE    Subjective  Chief complaint: Mildred Nolen is a 89 y.o. female who is a long term care patient being seen and evaluated for palliative care    HPI: .  Is in bed, responding slowly.  Is wearing oxygen.  She reports that she is comfortable.  Cooperative with this visit.   Denies any chest pain or tightness.  Nursing reports that she is only eating a few bites over the past 3 to 4 days.  Nursing is encouraging fluids.  Recent urinalysis was negative, no culture indicated.  Remains afebrile  Has been admitted into palliative care.   HPI      Objective  Vital signs: 126/72-74-18-97.3     Physical Exam  Vitals and nursing note reviewed.   Constitutional:       General: She is not in acute distress.     Comments: On oxygen therapy   Awake    Responding slowly    Eyes:      Extraocular Movements: Extraocular movements intact.   Cardiovascular:      Rate and Rhythm: Normal rate and regular rhythm.   Pulmonary:      Effort: Pulmonary effort is normal.      Breath sounds: Normal breath sounds.      Comments: Lungs clear   Abdominal:      General: Bowel sounds are normal.      Palpations: Abdomen is soft.   Musculoskeletal:      Cervical back: Neck supple.      Right lower leg: No edema.      Left lower leg: No edema.   Neurological:      Mental Status: She is alert.   Psychiatric:         Mood and Affect: Mood normal.         Behavior: Behavior is cooperative.         Assessment/Plan  Problem List Items Addressed This Visit       Hypertensive heart disease with heart failure (Multi) - Primary     Monitor BP   continue with antihypertensive meds   RENNY diet  Has been admitted into palliative care program          COPD (chronic obstructive pulmonary disease) (Multi)     Lungs clear.   No wheezing   Denies any SOB   Oxygen therapy          Edema     Remains with extensive dependent edema in both legs  Encourage elevation  Ace wraps for light  compression.               Other chronic pain     Chronic pain in both legs    Reports 8-9/10 pain without medication  Reports percocet helps, 3 x day   Legs remains dependent with extensive lymphedema   Chronic inflammation   Has been admitted into palliative care program          A-fib (Multi)     Eliquis  Bleeding precautions  Monitor heart rate          Medications, treatments, and labs reviewed  Continue medications and treatments as listed in EMR    ELIZABETH Howard      Electronically Signed By: ELIZABETH Howard   5/25/24  3:01 PM

## 2024-05-24 ENCOUNTER — NURSING HOME VISIT (OUTPATIENT)
Dept: POST ACUTE CARE | Facility: EXTERNAL LOCATION | Age: 89
End: 2024-05-24
Payer: MEDICARE

## 2024-05-24 DIAGNOSIS — J44.9 CHRONIC OBSTRUCTIVE PULMONARY DISEASE, UNSPECIFIED COPD TYPE (MULTI): ICD-10-CM

## 2024-05-24 DIAGNOSIS — K21.9 GASTROESOPHAGEAL REFLUX DISEASE WITHOUT ESOPHAGITIS: ICD-10-CM

## 2024-05-24 DIAGNOSIS — I11.0 HYPERTENSIVE HEART DISEASE WITH HEART FAILURE (MULTI): ICD-10-CM

## 2024-05-24 DIAGNOSIS — R39.9 SYMPTOMS OF URINARY TRACT INFECTION: Primary | ICD-10-CM

## 2024-05-24 PROCEDURE — 99308 SBSQ NF CARE LOW MDM 20: CPT | Performed by: INTERNAL MEDICINE

## 2024-05-24 NOTE — LETTER
Patient: Mildred Nolen  : 1930    Encounter Date: 2024    PROGRESS NOTE    Subjective  Chief complaint: Mildred Nolen is a 89 y.o. female who is a long term care patient being seen and evaluated for follow-up.    HPI:  HPI  Patient is on palliative care at this time.  Patient had a UA obtained and was negative, no new orders.  Patient was seen and examined at the bedside, appears to be in no acute distress.  Patient denies fever or chills.    Objective  Vital signs: 140/68, 72, 18, 97.6    Physical Exam  Constitutional:       General: She is not in acute distress.     Appearance: She is obese.   Eyes:      Extraocular Movements: Extraocular movements intact.   Pulmonary:      Effort: Pulmonary effort is normal.   Musculoskeletal:      Cervical back: Neck supple.      Right lower leg: Edema present.      Left lower leg: Edema present.   Neurological:      Mental Status: She is alert.   Psychiatric:         Mood and Affect: Mood normal.         Behavior: Behavior is cooperative.         Assessment/Plan  Problem List Items Addressed This Visit       Hypertensive heart disease with heart failure (Multi)     Monitor blood pressure  Diltiazem  Metoprolol  RENNY diet   CHF, monitor for shortness of breath.  Furosemide         Gastroesophageal reflux disease without esophagitis     PPI  Monitor for increased GI symptoms         COPD (chronic obstructive pulmonary disease) (Multi)     O2  Monitor for shortness of breath         Symptoms of urinary tract infection - Primary     UA obtained negative  Continue to monitor          Medications, treatments, and labs reviewed  Continue medications and treatments as listed in EMR    Scribe Attestation  I, Joseph Landis   attest that this documentation has been prepared under the direction and in the presence of Nadir Black MD    Provider Attestation - Scribe documentation  All medical record entries made by the Scribe were at my direction and personally  dictated by me. I have reviewed the chart and agree that the record accurately reflects my personal performance of the history, physical exam, discussion and plan.   Nadir Black MD            Electronically Signed By: Nadir Black MD   5/24/24  6:13 PM

## 2024-05-24 NOTE — PROGRESS NOTES
PROGRESS NOTE    Subjective   Chief complaint: Mildred Nolen is a 89 y.o. female who is a long term care patient being seen and evaluated for palliative care    HPI: .  Is in bed, responding slowly.  Is wearing oxygen.  She reports that she is comfortable.  Cooperative with this visit.   Denies any chest pain or tightness.  Nursing reports that she is only eating a few bites over the past 3 to 4 days.  Nursing is encouraging fluids.  Recent urinalysis was negative, no culture indicated.  Remains afebrile  Has been admitted into palliative care.   HPI      Objective   Vital signs: 126/72-74-18-97.3     Physical Exam  Vitals and nursing note reviewed.   Constitutional:       General: She is not in acute distress.     Comments: On oxygen therapy   Awake    Responding slowly    Eyes:      Extraocular Movements: Extraocular movements intact.   Cardiovascular:      Rate and Rhythm: Normal rate and regular rhythm.   Pulmonary:      Effort: Pulmonary effort is normal.      Breath sounds: Normal breath sounds.      Comments: Lungs clear   Abdominal:      General: Bowel sounds are normal.      Palpations: Abdomen is soft.   Musculoskeletal:      Cervical back: Neck supple.      Right lower leg: No edema.      Left lower leg: No edema.   Neurological:      Mental Status: She is alert.   Psychiatric:         Mood and Affect: Mood normal.         Behavior: Behavior is cooperative.         Assessment/Plan   Problem List Items Addressed This Visit       Hypertensive heart disease with heart failure (Multi) - Primary     Monitor BP   continue with antihypertensive meds   RENNY diet  Has been admitted into palliative care program          COPD (chronic obstructive pulmonary disease) (Multi)     Lungs clear.   No wheezing   Denies any SOB   Oxygen therapy          Edema     Remains with extensive dependent edema in both legs  Encourage elevation  Ace wraps for light compression.               Other chronic pain     Chronic pain in both  legs    Reports 8-9/10 pain without medication  Reports percocet helps, 3 x day   Legs remains dependent with extensive lymphedema   Chronic inflammation   Has been admitted into palliative care program          A-fib (Multi)     Eliquis  Bleeding precautions  Monitor heart rate          Medications, treatments, and labs reviewed  Continue medications and treatments as listed in EMR    Lauren Cerna, APRN-CNP

## 2024-05-24 NOTE — PROGRESS NOTES
PROGRESS NOTE    Subjective   Chief complaint: Mildred Nolen is a 89 y.o. female who is a long term care patient being seen and evaluated for follow-up.    HPI:  HPI  Patient is on palliative care at this time.  Patient had a UA obtained and was negative, no new orders.  Patient was seen and examined at the bedside, appears to be in no acute distress.  Patient denies fever or chills.    Objective   Vital signs: 140/68, 72, 18, 97.6    Physical Exam  Constitutional:       General: She is not in acute distress.     Appearance: She is obese.   Eyes:      Extraocular Movements: Extraocular movements intact.   Pulmonary:      Effort: Pulmonary effort is normal.   Musculoskeletal:      Cervical back: Neck supple.      Right lower leg: Edema present.      Left lower leg: Edema present.   Neurological:      Mental Status: She is alert.   Psychiatric:         Mood and Affect: Mood normal.         Behavior: Behavior is cooperative.         Assessment/Plan   Problem List Items Addressed This Visit       Hypertensive heart disease with heart failure (Multi)     Monitor blood pressure  Diltiazem  Metoprolol  RENNY diet   CHF, monitor for shortness of breath.  Furosemide         Gastroesophageal reflux disease without esophagitis     PPI  Monitor for increased GI symptoms         COPD (chronic obstructive pulmonary disease) (Multi)     O2  Monitor for shortness of breath         Symptoms of urinary tract infection - Primary     UA obtained negative  Continue to monitor          Medications, treatments, and labs reviewed  Continue medications and treatments as listed in EMR    Scribe Attestation  I, Joseph Landis   attest that this documentation has been prepared under the direction and in the presence of Nadir Black MD    Provider Attestation - Scribe documentation  All medical record entries made by the Scribe were at my direction and personally dictated by me. I have reviewed the chart and agree that the record  accurately reflects my personal performance of the history, physical exam, discussion and plan.   Nadir Black MD

## 2024-05-25 PROBLEM — L03.116 BILATERAL CELLULITIS OF LOWER LEG: Status: RESOLVED | Noted: 2023-06-09 | Resolved: 2024-05-25

## 2024-05-25 PROBLEM — L03.115 BILATERAL CELLULITIS OF LOWER LEG: Status: RESOLVED | Noted: 2023-06-09 | Resolved: 2024-05-25

## 2024-05-25 NOTE — ASSESSMENT & PLAN NOTE
Chronic pain in both legs    Reports 8-9/10 pain without medication  Reports percocet helps, 3 x day   Legs remains dependent with extensive lymphedema   Chronic inflammation   Has been admitted into palliative care program

## 2024-05-25 NOTE — ASSESSMENT & PLAN NOTE
Monitor BP   continue with antihypertensive meds   RENNY diet  Has been admitted into palliative care program

## 2024-05-25 NOTE — ASSESSMENT & PLAN NOTE
Chronic cellulitis Remains on antibiotics. Her legs are wrapped with ace compression wraps  Her legs not inspected at this visit  Has been evaluated for customized chair that would allow elevation.   Has extensive persistent lymphedema.   Percocet pain prn

## 2024-06-14 ENCOUNTER — NURSING HOME VISIT (OUTPATIENT)
Dept: POST ACUTE CARE | Facility: EXTERNAL LOCATION | Age: 89
End: 2024-06-14
Payer: MEDICARE

## 2024-06-14 DIAGNOSIS — J44.9 CHRONIC OBSTRUCTIVE PULMONARY DISEASE, UNSPECIFIED COPD TYPE (MULTI): Primary | ICD-10-CM

## 2024-06-14 DIAGNOSIS — I48.91 ATRIAL FIBRILLATION, UNSPECIFIED TYPE (MULTI): ICD-10-CM

## 2024-06-14 DIAGNOSIS — I63.9 CEREBROVASCULAR ACCIDENT (CVA), UNSPECIFIED MECHANISM (MULTI): ICD-10-CM

## 2024-06-14 DIAGNOSIS — I11.0 HYPERTENSIVE HEART DISEASE WITH HEART FAILURE (MULTI): ICD-10-CM

## 2024-06-14 DIAGNOSIS — K21.9 GASTROESOPHAGEAL REFLUX DISEASE WITHOUT ESOPHAGITIS: ICD-10-CM

## 2024-06-14 NOTE — LETTER
Patient: Mildred Nolen  : 1930    Encounter Date: 2024    PROGRESS NOTE    Subjective  Chief complaint: Mildred Nolen is a 89 y.o. female who is a long term care patient being seen and evaluated for monthly general medical care and follow-up    HPI:  HPI  Patient presents for general medical care and f/u.  Patient seen and examined at bedside.  No issues per nursing.  Patient has no acute complaints.  HTN Denies chest pain and headache.  CHF stable, denies sob, orthopnea, weight gain.  GERD controlled.  Denies heartburn, regurgitation, epigastric discomfort, sour taste, and cough.  COPD stable, denies sob, wheezing, cough.  AFIB stable, denies palpitations and chest pain.  No acute distress.  Mentation at baseline.    Objective  Vital signs: 140/68, 72, 18, 97.6    Physical Exam  Constitutional:       General: She is not in acute distress.     Appearance: She is obese.   Eyes:      Extraocular Movements: Extraocular movements intact.   Pulmonary:      Effort: Pulmonary effort is normal.   Musculoskeletal:      Cervical back: Neck supple.      Right lower leg: Edema present.      Left lower leg: Edema present.   Neurological:      Mental Status: She is alert.   Psychiatric:         Mood and Affect: Mood normal.         Behavior: Behavior is cooperative.         Assessment/Plan  Problem List Items Addressed This Visit       Hypertensive heart disease with heart failure (Multi)     Monitor blood pressure  Diltiazem  Metoprolol  RENNY diet   CHF, monitor for shortness of breath.  Furosemide         Gastroesophageal reflux disease without esophagitis     PPI  Monitor for increased GI symptoms         COPD (chronic obstructive pulmonary disease) (Multi) - Primary     O2  Monitor for shortness of breath         CVA (cerebral vascular accident) (Multi)     Continue statin  Monitor for weakness, change in mentation         A-fib (Multi)     Anticoagulant  Bleeding precautions  Monitor heart rate           Medications, treatments, and labs reviewed  Continue medications and treatments as listed in EMR    Scribe Attestation  I, Joseph Landis   attest that this documentation has been prepared under the direction and in the presence of Nadir Black MD    Provider Attestation - Scribe documentation  All medical record entries made by the Scribe were at my direction and personally dictated by me. I have reviewed the chart and agree that the record accurately reflects my personal performance of the history, physical exam, discussion and plan.   Nadir Black MD            Electronically Signed By: Nadir Black MD   6/14/24  3:11 PM

## 2024-06-14 NOTE — PROGRESS NOTES
PROGRESS NOTE    Subjective   Chief complaint: Mildred Nolen is a 89 y.o. female who is a long term care patient being seen and evaluated for monthly general medical care and follow-up    HPI:  HPI  Patient presents for general medical care and f/u.  Patient seen and examined at bedside.  No issues per nursing.  Patient has no acute complaints.  HTN Denies chest pain and headache.  CHF stable, denies sob, orthopnea, weight gain.  GERD controlled.  Denies heartburn, regurgitation, epigastric discomfort, sour taste, and cough.  COPD stable, denies sob, wheezing, cough.  AFIB stable, denies palpitations and chest pain.  No acute distress.  Mentation at baseline.    Objective   Vital signs: 140/68, 72, 18, 97.6    Physical Exam  Constitutional:       General: She is not in acute distress.     Appearance: She is obese.   Eyes:      Extraocular Movements: Extraocular movements intact.   Pulmonary:      Effort: Pulmonary effort is normal.   Musculoskeletal:      Cervical back: Neck supple.      Right lower leg: Edema present.      Left lower leg: Edema present.   Neurological:      Mental Status: She is alert.   Psychiatric:         Mood and Affect: Mood normal.         Behavior: Behavior is cooperative.         Assessment/Plan   Problem List Items Addressed This Visit       Hypertensive heart disease with heart failure (Multi)     Monitor blood pressure  Diltiazem  Metoprolol  RENNY diet   CHF, monitor for shortness of breath.  Furosemide         Gastroesophageal reflux disease without esophagitis     PPI  Monitor for increased GI symptoms         COPD (chronic obstructive pulmonary disease) (Multi) - Primary     O2  Monitor for shortness of breath         CVA (cerebral vascular accident) (Multi)     Continue statin  Monitor for weakness, change in mentation         A-fib (Multi)     Anticoagulant  Bleeding precautions  Monitor heart rate          Medications, treatments, and labs reviewed  Continue medications and  treatments as listed in EMR    Scribe Attestation  I, Joseph Landis   attest that this documentation has been prepared under the direction and in the presence of Nadir Black MD    Provider Attestation - Scribe documentation  All medical record entries made by the Scribe were at my direction and personally dictated by me. I have reviewed the chart and agree that the record accurately reflects my personal performance of the history, physical exam, discussion and plan.   Nadir Black MD

## 2024-06-16 ENCOUNTER — LAB REQUISITION (OUTPATIENT)
Dept: LAB | Facility: HOSPITAL | Age: 89
End: 2024-06-16
Payer: MEDICARE

## 2024-06-16 DIAGNOSIS — R31.9 HEMATURIA, UNSPECIFIED: ICD-10-CM

## 2024-06-16 DIAGNOSIS — K92.1 MELENA: ICD-10-CM

## 2024-06-16 LAB
ANION GAP SERPL CALC-SCNC: 14 MMOL/L (ref 10–20)
BASOPHILS # BLD AUTO: 0.04 X10*3/UL (ref 0–0.1)
BASOPHILS NFR BLD AUTO: 0.7 %
BUN SERPL-MCNC: 39 MG/DL (ref 6–23)
CALCIUM SERPL-MCNC: 8.9 MG/DL (ref 8.6–10.3)
CHLORIDE SERPL-SCNC: 102 MMOL/L (ref 98–107)
CO2 SERPL-SCNC: 29 MMOL/L (ref 21–32)
CREAT SERPL-MCNC: 1.15 MG/DL (ref 0.5–1.05)
EGFRCR SERPLBLD CKD-EPI 2021: 45 ML/MIN/1.73M*2
EOSINOPHIL # BLD AUTO: 0.24 X10*3/UL (ref 0–0.4)
EOSINOPHIL NFR BLD AUTO: 4.4 %
ERYTHROCYTE [DISTWIDTH] IN BLOOD BY AUTOMATED COUNT: 16.5 % (ref 11.5–14.5)
GLUCOSE SERPL-MCNC: 110 MG/DL (ref 74–99)
HCT VFR BLD AUTO: 44.2 % (ref 36–46)
HGB BLD-MCNC: 14.8 G/DL (ref 12–16)
IMM GRANULOCYTES # BLD AUTO: 0.02 X10*3/UL (ref 0–0.5)
IMM GRANULOCYTES NFR BLD AUTO: 0.4 % (ref 0–0.9)
LYMPHOCYTES # BLD AUTO: 1.78 X10*3/UL (ref 0.8–3)
LYMPHOCYTES NFR BLD AUTO: 32.7 %
MCH RBC QN AUTO: 30.4 PG (ref 26–34)
MCHC RBC AUTO-ENTMCNC: 33.5 G/DL (ref 32–36)
MCV RBC AUTO: 91 FL (ref 80–100)
MONOCYTES # BLD AUTO: 0.43 X10*3/UL (ref 0.05–0.8)
MONOCYTES NFR BLD AUTO: 7.9 %
NEUTROPHILS # BLD AUTO: 2.93 X10*3/UL (ref 1.6–5.5)
NEUTROPHILS NFR BLD AUTO: 53.9 %
NRBC BLD-RTO: 0 /100 WBCS (ref 0–0)
PLATELET # BLD AUTO: 159 X10*3/UL (ref 150–450)
POTASSIUM SERPL-SCNC: 2.8 MMOL/L (ref 3.5–5.3)
RBC # BLD AUTO: 4.87 X10*6/UL (ref 4–5.2)
SODIUM SERPL-SCNC: 142 MMOL/L (ref 136–145)
WBC # BLD AUTO: 5.4 X10*3/UL (ref 4.4–11.3)

## 2024-06-16 PROCEDURE — 80048 BASIC METABOLIC PNL TOTAL CA: CPT | Mod: OUT | Performed by: INTERNAL MEDICINE

## 2024-06-16 PROCEDURE — 85025 COMPLETE CBC W/AUTO DIFF WBC: CPT | Mod: OUT | Performed by: INTERNAL MEDICINE

## 2024-06-19 ENCOUNTER — NURSING HOME VISIT (OUTPATIENT)
Dept: POST ACUTE CARE | Facility: EXTERNAL LOCATION | Age: 89
End: 2024-06-19
Payer: MEDICARE

## 2024-06-19 DIAGNOSIS — J44.9 CHRONIC OBSTRUCTIVE PULMONARY DISEASE, UNSPECIFIED COPD TYPE (MULTI): ICD-10-CM

## 2024-06-19 DIAGNOSIS — Z51.5 PALLIATIVE CARE PATIENT: Primary | ICD-10-CM

## 2024-06-19 DIAGNOSIS — I11.0 HYPERTENSIVE HEART DISEASE WITH HEART FAILURE (MULTI): ICD-10-CM

## 2024-06-19 PROCEDURE — 99308 SBSQ NF CARE LOW MDM 20: CPT | Performed by: INTERNAL MEDICINE

## 2024-06-19 NOTE — LETTER
Patient: Mildred Nolen  : 1930    Encounter Date: 2024    PROGRESS NOTE    Subjective  Chief complaint: Mildred Nolen is a 89 y.o. female who is a long term care patient being seen and evaluated for follow-up.    HPI:  HPI  Patient is currently on palliative care.  Patient does have pain control with as needed medications and Percocet, tolerating.  Patient seen and examined at the bedside, appears to be in no acute distress.    Objective  Vital signs: 140/68, 72, 18, 97.6, 98%    Physical Exam  Constitutional:       General: She is not in acute distress.  Eyes:      Extraocular Movements: Extraocular movements intact.   Pulmonary:      Effort: Pulmonary effort is normal.   Musculoskeletal:      Cervical back: Neck supple.   Neurological:      Mental Status: She is alert.   Psychiatric:         Mood and Affect: Mood normal.         Behavior: Behavior is cooperative.         Assessment/Plan  Problem List Items Addressed This Visit       Hypertensive heart disease with heart failure (Multi)     Monitor blood pressure  Diltiazem  Metoprolol  RENNY diet   CHF, monitor for shortness of breath.  Furosemide         COPD (chronic obstructive pulmonary disease) (Multi)     O2  Monitor for shortness of breath         Palliative care patient - Primary     Palliative care for comfort  Pain control  Monitor          Medications, treatments, and labs reviewed  Continue medications and treatments as listed in EMR    Scribe Attestation  IAna Scribe   attest that this documentation has been prepared under the direction and in the presence of Nadir Black MD    Provider Attestation - Scribe documentation  All medical record entries made by the Scribe were at my direction and personally dictated by me. I have reviewed the chart and agree that the record accurately reflects my personal performance of the history, physical exam, discussion and plan.   Nadir Black MD            Electronically Signed By:  Nadir Black MD   6/19/24  6:21 PM

## 2024-06-19 NOTE — ASSESSMENT & PLAN NOTE
Provider Staff:  Action required for this patient     Please see care gap opportunities below in Care Due Message.    Thanks!  [facility]     Appointments      Date Provider   Last Visit   1/16/2024 Estephania Vargas MD   Next Visit   Visit date not found Estephania Vargas MD      Refill Decision Note   Bridget Sanderson  is requesting a refill authorization.  Brief Assessment and Rationale for Refill:  Approve     Medication Therapy Plan:         Comments:     Note composed:3:06 AM 05/21/2024            Palliative care for comfort  Pain control  Monitor

## 2024-06-19 NOTE — PROGRESS NOTES
PROGRESS NOTE    Subjective   Chief complaint: Mildred Nolen is a 89 y.o. female who is a long term care patient being seen and evaluated for follow-up.    HPI:  HPI  Patient is currently on palliative care.  Patient does have pain control with as needed medications and Percocet, tolerating.  Patient seen and examined at the bedside, appears to be in no acute distress.    Objective   Vital signs: 140/68, 72, 18, 97.6, 98%    Physical Exam  Constitutional:       General: She is not in acute distress.  Eyes:      Extraocular Movements: Extraocular movements intact.   Pulmonary:      Effort: Pulmonary effort is normal.   Musculoskeletal:      Cervical back: Neck supple.   Neurological:      Mental Status: She is alert.   Psychiatric:         Mood and Affect: Mood normal.         Behavior: Behavior is cooperative.         Assessment/Plan   Problem List Items Addressed This Visit       Hypertensive heart disease with heart failure (Multi)     Monitor blood pressure  Diltiazem  Metoprolol  RENNY diet   CHF, monitor for shortness of breath.  Furosemide         COPD (chronic obstructive pulmonary disease) (Multi)     O2  Monitor for shortness of breath         Palliative care patient - Primary     Palliative care for comfort  Pain control  Monitor          Medications, treatments, and labs reviewed  Continue medications and treatments as listed in EMR    Scribe Attestation  I, Joseph Landis   attest that this documentation has been prepared under the direction and in the presence of Nadir Black MD    Provider Attestation - Scribe documentation  All medical record entries made by the Scribe were at my direction and personally dictated by me. I have reviewed the chart and agree that the record accurately reflects my personal performance of the history, physical exam, discussion and plan.   Nadir Black MD

## 2024-07-12 ENCOUNTER — NURSING HOME VISIT (OUTPATIENT)
Dept: POST ACUTE CARE | Facility: EXTERNAL LOCATION | Age: 89
End: 2024-07-12
Payer: MEDICARE

## 2024-07-12 DIAGNOSIS — I11.0 HYPERTENSIVE HEART DISEASE WITH HEART FAILURE (MULTI): ICD-10-CM

## 2024-07-12 DIAGNOSIS — K21.9 GASTROESOPHAGEAL REFLUX DISEASE WITHOUT ESOPHAGITIS: ICD-10-CM

## 2024-07-12 DIAGNOSIS — I63.9 CEREBROVASCULAR ACCIDENT (CVA), UNSPECIFIED MECHANISM (MULTI): ICD-10-CM

## 2024-07-12 DIAGNOSIS — I48.91 ATRIAL FIBRILLATION, UNSPECIFIED TYPE (MULTI): ICD-10-CM

## 2024-07-12 DIAGNOSIS — J44.9 CHRONIC OBSTRUCTIVE PULMONARY DISEASE, UNSPECIFIED COPD TYPE (MULTI): Primary | ICD-10-CM

## 2024-07-12 NOTE — LETTER
Patient: Mildred Nolen  : 1930    Encounter Date: 2024    PROGRESS NOTE    Subjective  Chief complaint: Mildred Nolen is a 89 y.o. female who is a long term care patient being seen and evaluated for monthly general medical care and follow-up    HPI:  HPI  Patient presents for general medical care and f/u.  Patient seen and examined at bedside.  No issues per nursing.  Patient has no acute complaints.  Asked to evaluate patient's continued use of cranberry capsules for decreasing UTI recurrence, patient to continue.  Patient also continues on omeprazole and famotidine for GERD diagnosis.  Patient's symptoms are currently controlled.  Orders placed to discontinue omeprazole and continue famotidine as currently ordered.  HTN BP at goal.  Denies chest pain and headache.  CHF stable, denies sob, orthopnea, weight gain.  GERD controlled.  Denies heartburn, regurgitation, epigastric discomfort, sour taste, and cough.  COPD stable, denies sob, wheezing, cough.  AFIB stable, denies palpitations and chest pain.  Hx CVA, denies changes in weakness and speech.  Mentation at baseline, no acute distress.    Objective  Vital signs: 140/68, 72, 18, 97.6, 98%    Physical Exam  Constitutional:       General: She is not in acute distress.  Eyes:      Extraocular Movements: Extraocular movements intact.   Pulmonary:      Effort: Pulmonary effort is normal.   Musculoskeletal:      Cervical back: Neck supple.   Neurological:      Mental Status: She is alert.   Psychiatric:         Mood and Affect: Mood normal.         Behavior: Behavior is cooperative.         Assessment/Plan  Problem List Items Addressed This Visit       Hypertensive heart disease with heart failure (Multi)     Monitor blood pressure  Diltiazem  Metoprolol  RENNY diet   CHF, monitor for shortness of breath.  Furosemide         Gastroesophageal reflux disease without esophagitis     Discontinue omeprazole  Continue famotidine as currently ordered  Monitor for  increased GI symptoms         COPD (chronic obstructive pulmonary disease) (Multi) - Primary     O2  Monitor for shortness of breath         CVA (cerebral vascular accident) (Multi)     Continue statin  Monitor for weakness, change in mentation         A-fib (Multi)     Anticoagulant  Bleeding precautions  Monitor heart rate          Medications, treatments, and labs reviewed  Continue medications and treatments as listed in EMR    Scribe Attestation  IAna Scribyomaira   attest that this documentation has been prepared under the direction and in the presence of Nadir Black MD    Provider Attestation - Scribe documentation  All medical record entries made by the Scribe were at my direction and personally dictated by me. I have reviewed the chart and agree that the record accurately reflects my personal performance of the history, physical exam, discussion and plan.   Nadir Black MD            Electronically Signed By: Nadir Black MD   7/12/24  5:52 PM

## 2024-07-12 NOTE — PROGRESS NOTES
PROGRESS NOTE    Subjective   Chief complaint: Mildred Nolen is a 89 y.o. female who is a long term care patient being seen and evaluated for monthly general medical care and follow-up    HPI:  HPI  Patient presents for general medical care and f/u.  Patient seen and examined at bedside.  No issues per nursing.  Patient has no acute complaints.  Asked to evaluate patient's continued use of cranberry capsules for decreasing UTI recurrence, patient to continue.  Patient also continues on omeprazole and famotidine for GERD diagnosis.  Patient's symptoms are currently controlled.  Orders placed to discontinue omeprazole and continue famotidine as currently ordered.  HTN BP at goal.  Denies chest pain and headache.  CHF stable, denies sob, orthopnea, weight gain.  GERD controlled.  Denies heartburn, regurgitation, epigastric discomfort, sour taste, and cough.  COPD stable, denies sob, wheezing, cough.  AFIB stable, denies palpitations and chest pain.  Hx CVA, denies changes in weakness and speech.  Mentation at baseline, no acute distress.    Objective   Vital signs: 140/68, 72, 18, 97.6, 98%    Physical Exam  Constitutional:       General: She is not in acute distress.  Eyes:      Extraocular Movements: Extraocular movements intact.   Pulmonary:      Effort: Pulmonary effort is normal.   Musculoskeletal:      Cervical back: Neck supple.   Neurological:      Mental Status: She is alert.   Psychiatric:         Mood and Affect: Mood normal.         Behavior: Behavior is cooperative.         Assessment/Plan   Problem List Items Addressed This Visit       Hypertensive heart disease with heart failure (Multi)     Monitor blood pressure  Diltiazem  Metoprolol  RENNY diet   CHF, monitor for shortness of breath.  Furosemide         Gastroesophageal reflux disease without esophagitis     Discontinue omeprazole  Continue famotidine as currently ordered  Monitor for increased GI symptoms         COPD (chronic obstructive pulmonary  disease) (Multi) - Primary     O2  Monitor for shortness of breath         CVA (cerebral vascular accident) (Multi)     Continue statin  Monitor for weakness, change in mentation         A-fib (Multi)     Anticoagulant  Bleeding precautions  Monitor heart rate          Medications, treatments, and labs reviewed  Continue medications and treatments as listed in EMR    Scribe Attestation  Ana CHRISTIANSON Scribe   attest that this documentation has been prepared under the direction and in the presence of Nadir Black MD    Provider Attestation - Scribe documentation  All medical record entries made by the Scribe were at my direction and personally dictated by me. I have reviewed the chart and agree that the record accurately reflects my personal performance of the history, physical exam, discussion and plan.   Nadir Black MD

## 2024-07-17 ENCOUNTER — NURSING HOME VISIT (OUTPATIENT)
Dept: POST ACUTE CARE | Facility: EXTERNAL LOCATION | Age: 89
End: 2024-07-17
Payer: MEDICARE

## 2024-07-17 DIAGNOSIS — I63.9 CEREBROVASCULAR ACCIDENT (CVA), UNSPECIFIED MECHANISM (MULTI): Primary | ICD-10-CM

## 2024-07-17 DIAGNOSIS — I11.0 HYPERTENSIVE HEART DISEASE WITH HEART FAILURE (MULTI): ICD-10-CM

## 2024-07-17 DIAGNOSIS — J44.9 CHRONIC OBSTRUCTIVE PULMONARY DISEASE, UNSPECIFIED COPD TYPE (MULTI): ICD-10-CM

## 2024-07-17 PROCEDURE — 99308 SBSQ NF CARE LOW MDM 20: CPT | Performed by: INTERNAL MEDICINE

## 2024-07-17 NOTE — PROGRESS NOTES
PROGRESS NOTE    Subjective   Chief complaint: Mildred Nolen is a 89 y.o. female who is a long term care patient being seen and evaluated for failure to thrive    HPI:  HPI  An interactive audio and/or video telecommunication system which permits real time communications between the patient (at the originating site) and provider (at a distant site) was utilized to provide this telehealth service after obtaining verbal consent.  Patient is on hospice for comfort care measures for a diagnosis of CVA.  Patient is declining.  Patient has comfort meds in place and routine medications discontinued.  Patient appears to be in no acute distress.    Objective   Vital signs: 140/68, 72, 18, 97.6, 98%    Physical Exam  Constitutional:       General: She is not in acute distress.  Eyes:      Extraocular Movements: Extraocular movements intact.   Pulmonary:      Effort: Pulmonary effort is normal.   Musculoskeletal:      Cervical back: Neck supple.   Neurological:      Mental Status: She is alert.   Psychiatric:         Mood and Affect: Mood normal.         Behavior: Behavior is cooperative.         Assessment/Plan   Problem List Items Addressed This Visit       Hypertensive heart disease with heart failure (Multi)     Monitor blood pressure  RENNY diet   CHF, monitor for shortness of breath.         COPD (chronic obstructive pulmonary disease) (Multi)     O2  Monitor for shortness of breath         CVA (cerebral vascular accident) (Multi) - Primary     Hospice for comfort care measures  Comfort meds in place          Medications, treatments, and labs reviewed  Continue medications and treatments as listed in EMR    Scribe Attestation  IAna Scribe   attest that this documentation has been prepared under the direction and in the presence of Nadir Black MD    Provider Attestation - Scribe documentation  All medical record entries made by the Scribe were at my direction and personally dictated by me. I have reviewed  the chart and agree that the record accurately reflects my personal performance of the history, physical exam, discussion and plan.   Nadir Black MD